# Patient Record
Sex: MALE | Race: WHITE | NOT HISPANIC OR LATINO | Employment: OTHER | URBAN - METROPOLITAN AREA
[De-identification: names, ages, dates, MRNs, and addresses within clinical notes are randomized per-mention and may not be internally consistent; named-entity substitution may affect disease eponyms.]

---

## 2023-04-27 LAB
ALBUMIN (MG/L) IN URINE: <7 MG/L
ALBUMIN/CREATININE (UG/MG) IN URINE: NORMAL UG/MG CRT (ref 0–30)
ANION GAP IN SER/PLAS: 8 MMOL/L (ref 10–20)
CALCIUM (MG/DL) IN SER/PLAS: 9.2 MG/DL (ref 8.6–10.3)
CARBON DIOXIDE, TOTAL (MMOL/L) IN SER/PLAS: 35 MMOL/L (ref 21–32)
CHLORIDE (MMOL/L) IN SER/PLAS: 98 MMOL/L (ref 98–107)
CHOLESTEROL (MG/DL) IN SER/PLAS: 145 MG/DL (ref 0–199)
CHOLESTEROL IN HDL (MG/DL) IN SER/PLAS: 65.8 MG/DL
CHOLESTEROL/HDL RATIO: 2.2
CREATININE (MG/DL) IN SER/PLAS: 0.76 MG/DL (ref 0.5–1.3)
CREATININE (MG/DL) IN URINE: 54.9 MG/DL (ref 20–370)
GFR MALE: >90 ML/MIN/1.73M2
GLUCOSE (MG/DL) IN SER/PLAS: 394 MG/DL (ref 74–99)
LDL: 63 MG/DL (ref 0–99)
POTASSIUM (MMOL/L) IN SER/PLAS: 4.4 MMOL/L (ref 3.5–5.3)
SODIUM (MMOL/L) IN SER/PLAS: 137 MMOL/L (ref 136–145)
TRIGLYCERIDE (MG/DL) IN SER/PLAS: 81 MG/DL (ref 0–149)
UREA NITROGEN (MG/DL) IN SER/PLAS: 13 MG/DL (ref 6–23)
VLDL: 16 MG/DL (ref 0–40)

## 2023-04-28 LAB
ESTIMATED AVERAGE GLUCOSE FOR HBA1C: 243 MG/DL
HEMOGLOBIN A1C/HEMOGLOBIN TOTAL IN BLOOD: 10.1 %

## 2023-10-03 ENCOUNTER — APPOINTMENT (OUTPATIENT)
Dept: RADIOLOGY | Facility: HOSPITAL | Age: 58
End: 2023-10-03
Payer: COMMERCIAL

## 2023-10-03 ENCOUNTER — HOSPITAL ENCOUNTER (OUTPATIENT)
Dept: RADIOLOGY | Facility: HOSPITAL | Age: 58
End: 2023-10-03
Payer: COMMERCIAL

## 2023-10-11 ENCOUNTER — HOSPITAL ENCOUNTER (OUTPATIENT)
Dept: RADIOLOGY | Facility: HOSPITAL | Age: 58
Discharge: HOME | End: 2023-10-11
Payer: COMMERCIAL

## 2023-10-11 DIAGNOSIS — M19.011 PRIMARY OSTEOARTHRITIS, RIGHT SHOULDER: ICD-10-CM

## 2023-10-11 PROCEDURE — 71260 CT THORAX DX C+: CPT | Performed by: RADIOLOGY

## 2023-10-11 PROCEDURE — 71250 CT THORAX DX C-: CPT

## 2023-10-11 PROCEDURE — 73200 CT UPPER EXTREMITY W/O DYE: CPT | Mod: RT

## 2023-10-11 PROCEDURE — 73200 CT UPPER EXTREMITY W/O DYE: CPT | Mod: RIGHT SIDE | Performed by: STUDENT IN AN ORGANIZED HEALTH CARE EDUCATION/TRAINING PROGRAM

## 2023-10-11 PROCEDURE — 76377 3D RENDER W/INTRP POSTPROCES: CPT

## 2023-10-11 PROCEDURE — 76377 3D RENDER W/INTRP POSTPROCES: CPT | Mod: RIGHT SIDE | Performed by: STUDENT IN AN ORGANIZED HEALTH CARE EDUCATION/TRAINING PROGRAM

## 2023-10-14 ENCOUNTER — PHARMACY VISIT (OUTPATIENT)
Dept: PHARMACY | Facility: CLINIC | Age: 58
End: 2023-10-14
Payer: COMMERCIAL

## 2023-10-14 PROCEDURE — RXMED WILLOW AMBULATORY MEDICATION CHARGE

## 2023-10-20 PROBLEM — L60.1 ONYCHOLYSIS: Status: ACTIVE | Noted: 2022-04-04

## 2023-10-20 PROBLEM — M17.11 PRIMARY OSTEOARTHRITIS OF RIGHT KNEE: Status: ACTIVE | Noted: 2023-10-20

## 2023-10-20 PROBLEM — B02.9 HERPES ZOSTER: Status: ACTIVE | Noted: 2023-10-20

## 2023-10-20 PROBLEM — D22.70 MELANOCYTIC NEVI OF UNSPECIFIED LOWER LIMB, INCLUDING HIP: Status: ACTIVE | Noted: 2022-04-04

## 2023-10-20 PROBLEM — D22.9 MELANOCYTIC NEVI, UNSPECIFIED: Status: ACTIVE | Noted: 2022-04-04

## 2023-10-20 PROBLEM — M19.011 ARTHRITIS OF SHOULDER REGION, RIGHT: Status: ACTIVE | Noted: 2023-10-20

## 2023-10-20 PROBLEM — I10 BENIGN ESSENTIAL HYPERTENSION: Status: ACTIVE | Noted: 2021-09-21

## 2023-10-20 PROBLEM — N52.9 FUNCTIONAL IMPOTENCE: Status: ACTIVE | Noted: 2021-09-21

## 2023-10-20 PROBLEM — E11.65 TYPE 2 DIABETES MELLITUS WITH HYPERGLYCEMIA (MULTI): Status: ACTIVE | Noted: 2020-02-12

## 2023-10-20 PROBLEM — F41.1 GENERALIZED ANXIETY DISORDER: Status: ACTIVE | Noted: 2020-02-12

## 2023-10-20 PROBLEM — E66.01 MORBID OBESITY (MULTI): Status: ACTIVE | Noted: 2023-10-20

## 2023-10-20 PROBLEM — I47.10 PSVT (PAROXYSMAL SUPRAVENTRICULAR TACHYCARDIA) (CMS-HCC): Status: ACTIVE | Noted: 2023-10-20

## 2023-10-20 PROBLEM — M12.579 TRAUMATIC ARTHROPATHY OF ANKLE: Status: ACTIVE | Noted: 2023-10-20

## 2023-10-20 PROBLEM — L57.9 SKIN CHANGES DUE TO CHRONIC EXPOSURE TO NONIONIZING RADIATION, UNSPECIFIED: Status: ACTIVE | Noted: 2022-04-04

## 2023-10-20 PROBLEM — E78.5 HYPERLIPIDEMIA: Status: ACTIVE | Noted: 2021-09-21

## 2023-10-20 PROBLEM — D22.39 MELANOCYTIC NEVI OF OTHER PARTS OF FACE: Status: ACTIVE | Noted: 2022-04-04

## 2023-10-20 PROBLEM — K20.90 ESOPHAGITIS: Status: ACTIVE | Noted: 2023-10-20

## 2023-10-20 PROBLEM — R63.4 UNEXPLAINED WEIGHT LOSS: Status: ACTIVE | Noted: 2023-10-20

## 2023-10-20 PROBLEM — G89.4 CHRONIC PAIN SYNDROME: Status: ACTIVE | Noted: 2020-02-12

## 2023-10-20 PROBLEM — Z15.09 MSH2-RELATED LYNCH SYNDROME (HNPCC1): Status: ACTIVE | Noted: 2023-10-20

## 2023-10-20 PROBLEM — G47.33 OBSTRUCTIVE SLEEP APNEA SYNDROME: Status: ACTIVE | Noted: 2020-02-12

## 2023-10-20 PROBLEM — F33.9 DEPRESSION, RECURRENT (CMS-HCC): Status: ACTIVE | Noted: 2023-10-20

## 2023-10-20 PROBLEM — Z79.4 LONG-TERM INSULIN USE (MULTI): Status: ACTIVE | Noted: 2023-10-20

## 2023-10-20 PROBLEM — D22.5 MELANOCYTIC NEVI OF TRUNK: Status: ACTIVE | Noted: 2022-04-04

## 2023-10-20 PROBLEM — F41.1 ANXIETY STATE: Status: ACTIVE | Noted: 2023-10-20

## 2023-10-20 PROBLEM — Z15.09 GENETIC SUSCEPTIBILITY TO OTHER MALIGNANT NEOPLASM: Status: ACTIVE | Noted: 2020-02-12

## 2023-10-20 PROBLEM — G47.00 INSOMNIA: Status: ACTIVE | Noted: 2023-10-20

## 2023-10-20 PROBLEM — M25.511 BILATERAL SHOULDER PAIN: Status: ACTIVE | Noted: 2023-10-20

## 2023-10-20 PROBLEM — D48.5 NEOPLASM OF UNCERTAIN BEHAVIOR OF SKIN: Status: ACTIVE | Noted: 2022-04-04

## 2023-10-20 PROBLEM — M25.512 BILATERAL SHOULDER PAIN: Status: ACTIVE | Noted: 2023-10-20

## 2023-10-20 PROBLEM — Z85.820 PERSONAL HISTORY OF MALIGNANT MELANOMA OF SKIN: Status: ACTIVE | Noted: 2022-04-04

## 2023-10-20 PROBLEM — M50.30 DEGENERATIVE DISC DISEASE, CERVICAL: Status: ACTIVE | Noted: 2023-10-20

## 2023-10-20 PROBLEM — L81.4 OTHER MELANIN HYPERPIGMENTATION: Status: ACTIVE | Noted: 2022-04-04

## 2023-10-20 PROBLEM — D18.01 HEMANGIOMA OF SKIN AND SUBCUTANEOUS TISSUE: Status: ACTIVE | Noted: 2022-04-04

## 2023-10-20 PROBLEM — L90.5 SCAR CONDITION AND FIBROSIS OF SKIN: Status: ACTIVE | Noted: 2022-04-04

## 2023-10-20 PROBLEM — R10.9 PAIN, ABDOMINAL: Status: ACTIVE | Noted: 2023-10-20

## 2023-10-20 PROBLEM — D22.60 MELANOCYTIC NEVI OF UNSPECIFIED UPPER LIMB, INCLUDING SHOULDER: Status: ACTIVE | Noted: 2022-04-04

## 2023-10-20 PROBLEM — I11.9 BENIGN HYPERTENSIVE HEART DISEASE: Status: ACTIVE | Noted: 2023-10-20

## 2023-10-20 PROBLEM — F52.21 PSYCHOGENIC IMPOTENCE: Status: ACTIVE | Noted: 2020-10-26

## 2023-10-20 PROBLEM — L57.0 ACTINIC KERATOSIS: Status: ACTIVE | Noted: 2022-04-04

## 2023-10-20 PROBLEM — K63.5 POLYP OF COLON: Status: ACTIVE | Noted: 2023-10-20

## 2023-10-20 RX ORDER — BUPROPION HYDROCHLORIDE 150 MG/1
1 TABLET ORAL EVERY MORNING
COMMUNITY
Start: 2022-09-19 | End: 2024-05-24 | Stop reason: WASHOUT

## 2023-10-20 RX ORDER — BUPRENORPHINE AND NALOXONE 8; 2 MG/1; MG/1
1 FILM, SOLUBLE BUCCAL; SUBLINGUAL DAILY
COMMUNITY
End: 2024-05-24 | Stop reason: WASHOUT

## 2023-10-20 RX ORDER — GABAPENTIN 300 MG/1
300 CAPSULE ORAL 2 TIMES DAILY
Status: ON HOLD | COMMUNITY
End: 2023-11-21

## 2023-10-20 RX ORDER — CELECOXIB 200 MG/1
200 CAPSULE ORAL DAILY
Status: ON HOLD | COMMUNITY
End: 2023-11-21

## 2023-10-20 RX ORDER — LAMOTRIGINE 100 MG/1
100 TABLET ORAL NIGHTLY
Status: ON HOLD | COMMUNITY
End: 2023-11-21 | Stop reason: ALTCHOICE

## 2023-10-20 RX ORDER — ATORVASTATIN CALCIUM 10 MG/1
10 TABLET, FILM COATED ORAL DAILY
Status: ON HOLD | COMMUNITY
Start: 2021-04-26 | End: 2023-11-21 | Stop reason: ALTCHOICE

## 2023-10-20 RX ORDER — OXYCODONE AND ACETAMINOPHEN 7.5; 325 MG/1; MG/1
1 TABLET ORAL 3 TIMES DAILY
COMMUNITY

## 2023-10-20 RX ORDER — POLYETHYLENE GLYCOL 3350, SODIUM SULFATE ANHYDROUS, SODIUM BICARBONATE, SODIUM CHLORIDE, POTASSIUM CHLORIDE 236; 22.74; 6.74; 5.86; 2.97 G/4L; G/4L; G/4L; G/4L; G/4L
POWDER, FOR SOLUTION ORAL
COMMUNITY
Start: 2022-06-27 | End: 2024-05-24 | Stop reason: WASHOUT

## 2023-10-20 RX ORDER — LORAZEPAM 1 MG/1
1 TABLET ORAL NIGHTLY PRN
Status: ON HOLD | COMMUNITY
Start: 2018-04-11 | End: 2023-11-21 | Stop reason: ALTCHOICE

## 2023-10-20 RX ORDER — DULAGLUTIDE 3 MG/.5ML
3 INJECTION, SOLUTION SUBCUTANEOUS
COMMUNITY
Start: 2021-10-11 | End: 2024-05-24 | Stop reason: WASHOUT

## 2023-10-20 RX ORDER — SULFAMETHOXAZOLE AND TRIMETHOPRIM 800; 160 MG/1; MG/1
1 TABLET ORAL EVERY 12 HOURS
Status: ON HOLD | COMMUNITY
End: 2023-11-21 | Stop reason: ALTCHOICE

## 2023-10-20 RX ORDER — QUETIAPINE FUMARATE 50 MG/1
150 TABLET, FILM COATED ORAL NIGHTLY
COMMUNITY

## 2023-10-20 RX ORDER — OXYCODONE HYDROCHLORIDE 30 MG/1
30 TABLET, FILM COATED, EXTENDED RELEASE ORAL 2 TIMES DAILY
Status: ON HOLD | COMMUNITY
End: 2023-11-21 | Stop reason: ALTCHOICE

## 2023-10-20 RX ORDER — GLIMEPIRIDE 2 MG/1
2 TABLET ORAL
Status: ON HOLD | COMMUNITY
End: 2023-11-21 | Stop reason: ALTCHOICE

## 2023-10-20 RX ORDER — KETOROLAC TROMETHAMINE 10 MG/1
TABLET, FILM COATED ORAL
Status: ON HOLD | COMMUNITY
Start: 2021-08-18 | End: 2023-11-21 | Stop reason: ALTCHOICE

## 2023-10-20 RX ORDER — HYDROXYZINE HYDROCHLORIDE 10 MG/1
1 TABLET, FILM COATED ORAL 3 TIMES DAILY PRN
COMMUNITY
Start: 2018-10-02 | End: 2024-05-24 | Stop reason: WASHOUT

## 2023-10-20 RX ORDER — BUPROPION HYDROCHLORIDE 300 MG/1
300 TABLET ORAL EVERY MORNING
COMMUNITY

## 2023-10-20 RX ORDER — LISINOPRIL 10 MG/1
10 TABLET ORAL DAILY
COMMUNITY
Start: 2021-09-21 | End: 2024-05-24 | Stop reason: ALTCHOICE

## 2023-10-20 RX ORDER — ONDANSETRON 4 MG/1
4 TABLET, ORALLY DISINTEGRATING ORAL 3 TIMES DAILY
Status: ON HOLD | COMMUNITY
Start: 2023-03-18 | End: 2023-11-21 | Stop reason: ALTCHOICE

## 2023-10-20 RX ORDER — DEXTROSE 4 G
TABLET,CHEWABLE ORAL
COMMUNITY
Start: 2020-03-11 | End: 2024-05-24 | Stop reason: WASHOUT

## 2023-10-20 RX ORDER — LAMOTRIGINE 25 MG/1
1 TABLET ORAL DAILY
Status: ON HOLD | COMMUNITY
End: 2023-11-21 | Stop reason: ALTCHOICE

## 2023-10-20 RX ORDER — RISPERIDONE 0.25 MG/1
0.5 TABLET ORAL DAILY
Status: ON HOLD | COMMUNITY
End: 2023-11-21 | Stop reason: ALTCHOICE

## 2023-10-20 RX ORDER — CELECOXIB 400 MG/1
400 CAPSULE ORAL DAILY
Status: ON HOLD | COMMUNITY
End: 2023-11-21

## 2023-10-20 RX ORDER — ESCITALOPRAM OXALATE 20 MG/1
20 TABLET ORAL DAILY
COMMUNITY

## 2023-10-20 RX ORDER — QUETIAPINE FUMARATE 25 MG/1
1 TABLET, FILM COATED ORAL DAILY
Status: ON HOLD | COMMUNITY
Start: 2021-10-11 | End: 2023-11-21 | Stop reason: ALTCHOICE

## 2023-10-20 RX ORDER — LANCETS
EACH MISCELLANEOUS
COMMUNITY
Start: 2020-03-11

## 2023-10-20 RX ORDER — DOXYCYCLINE 100 MG/1
100 CAPSULE ORAL
Status: ON HOLD | COMMUNITY
Start: 2022-04-04 | End: 2023-11-21 | Stop reason: ALTCHOICE

## 2023-10-20 RX ORDER — CEPHALEXIN 500 MG/1
500 TABLET ORAL 4 TIMES DAILY
Status: ON HOLD | COMMUNITY
Start: 2023-08-14 | End: 2023-11-21 | Stop reason: ALTCHOICE

## 2023-10-20 RX ORDER — QUETIAPINE FUMARATE 50 MG/1
50 TABLET, FILM COATED ORAL DAILY
Status: ON HOLD | COMMUNITY
End: 2023-11-21 | Stop reason: ALTCHOICE

## 2023-10-20 RX ORDER — ESCITALOPRAM OXALATE 10 MG/1
1 TABLET ORAL DAILY
Status: ON HOLD | COMMUNITY
End: 2023-11-21

## 2023-10-20 RX ORDER — OMEPRAZOLE 40 MG/1
40 CAPSULE, DELAYED RELEASE ORAL DAILY
Status: ON HOLD | COMMUNITY
Start: 2020-10-09 | End: 2023-11-21 | Stop reason: ALTCHOICE

## 2023-10-20 RX ORDER — LAMOTRIGINE 150 MG/1
1 TABLET ORAL DAILY
COMMUNITY

## 2023-10-20 RX ORDER — OXYCODONE HYDROCHLORIDE 15 MG/1
15 TABLET ORAL EVERY 4 HOURS PRN
Status: ON HOLD | COMMUNITY
Start: 2014-07-08 | End: 2023-11-21 | Stop reason: ALTCHOICE

## 2023-10-23 ENCOUNTER — OFFICE VISIT (OUTPATIENT)
Dept: ORTHOPEDIC SURGERY | Facility: CLINIC | Age: 58
End: 2023-10-23
Payer: COMMERCIAL

## 2023-10-23 VITALS — BODY MASS INDEX: 31.08 KG/M2 | HEIGHT: 71 IN | WEIGHT: 222 LBS

## 2023-10-23 DIAGNOSIS — M19.011 ARTHRITIS OF RIGHT SHOULDER REGION: Primary | ICD-10-CM

## 2023-10-23 DIAGNOSIS — M75.121 COMPLETE TEAR OF RIGHT ROTATOR CUFF, UNSPECIFIED WHETHER TRAUMATIC: ICD-10-CM

## 2023-10-23 DIAGNOSIS — M75.21 BICEPS TENDINITIS OF RIGHT SHOULDER: ICD-10-CM

## 2023-10-23 PROCEDURE — 4010F ACE/ARB THERAPY RXD/TAKEN: CPT | Performed by: ORTHOPAEDIC SURGERY

## 2023-10-23 PROCEDURE — 99215 OFFICE O/P EST HI 40 MIN: CPT | Performed by: ORTHOPAEDIC SURGERY

## 2023-10-23 PROCEDURE — 3046F HEMOGLOBIN A1C LEVEL >9.0%: CPT | Performed by: ORTHOPAEDIC SURGERY

## 2023-10-23 NOTE — PROGRESS NOTES
58-year-old male who is 8 years status post right shoulder open failed rotator cuff repair with severe pain in his bilateral shoulders worse on the right shoulder.  He states he has pain every day that significantly impacts his activities of daily living.  Pain is worse activity better with rest.  He has failed anti-inflammatories physical therapy and steroid injections    Patients' self reported past medical history, medications, allergies, surgical history, family and social history as well as a 10 point review of systems has been documented in the new patient intake form and scanned into the patient's electronic medical record.  The intake form was reviewed by Dr Otero during the office visit and signed by Dr. Otero and the patient.  Pertinent findings are documented in the HPI.    General Multi-System Physical Exam:  Constitutional  General appearance:  Alert, oriented, and in no acute distress.  Well developed, well nourished.  Head and Face  Head and face:  Normocephalic and atraumatic.  Ears, Nose, Mouth, and Throat  External inspection of ears and nose: Normal.  Eyes:  Pupils are equal and round.  Neck  Neck:  no neck mass was observed.  Pulmonary  Respiratory effort:  no respiratory distress.  Cardiovascular  Intact distal pulses.  Lymphatic  Palpation of lymph nodes in the affected extremity:  Normal.  Skin  Skin and subcutaneous tissue:  Normal skin color and pigmentation.  Normal skin turgor.  No rashes.  Neurologic  Sensation:  normal to light touch.  Psychiatric  Judgement and insight:  Intact.  Mood and affect:  Normal.  Musculoskeletal  Right shoulder has a maximum of 60 degrees of abduction forward flexion with a maximum external rotation of 10 degrees of internal rotation and maximum internal rotation to not quite greater trochanter.  Positive Neer positive Dominguez positive Jobes with pain and severe weakness positive biceps tenderness.  Grossly neurovascular tact right upper extremity      Branden independently interpreted the patient's CT (performed by the Radiology department) by viewing the CT images and this is Dr. Otero's personal interpretation:     CT shows severe arthritis right shoulder with posterior sided wear      We had a long discussion regards to his right shoulder severe arthritis and rotator cuff tearing.  We talked about conservative treatment which the patient states has failed and he is in severe pain.  We talked about right shoulder reverse shoulder placement of the biceps tenodesis and talked about the risks including but not limited to infection risk of injury to the axillary nerve risk of shoulder dislocation or dissociation.  Patient understands all the risk versus benefits wants to proceed with surgery signed appropriate surgical consents for a right reverse shoulder placement biceps tenodesis on November 21 see him back for surgery sooner if necessary        This patient has had longstanding pain and weakness in their affected extremity which has gotten significantly worse over the last few months.  Non-operative treatment has failed to help this patient and the pain is severe enough to warrent surgery as the appropriate treatment at this time.  That would classify this problem as a chronic illness with severe exacerbation and progression.    We discussed their surgery at great length.  This is an elective major surgery which is warranted in this case due to the patient's failure of non-operative treatment and their significant level of pain and progression of the disease.  We discussed identified patient and procedural risk factors and how these risk factors may increase the risk of the surgery or influence the outcome of the surgical procedure.   We also discussed how delaying surgery and continuing non-operative treatment may lead to a progression of the disease and high risk of further morbidity.

## 2023-11-06 ENCOUNTER — OFFICE VISIT (OUTPATIENT)
Dept: PRIMARY CARE | Facility: CLINIC | Age: 58
End: 2023-11-06
Payer: COMMERCIAL

## 2023-11-06 VITALS
BODY MASS INDEX: 33.19 KG/M2 | TEMPERATURE: 97.3 F | DIASTOLIC BLOOD PRESSURE: 84 MMHG | SYSTOLIC BLOOD PRESSURE: 136 MMHG | OXYGEN SATURATION: 95 % | WEIGHT: 238 LBS | HEART RATE: 86 BPM

## 2023-11-06 DIAGNOSIS — J18.9 ATYPICAL PNEUMONIA: Primary | ICD-10-CM

## 2023-11-06 PROBLEM — R10.9 PAIN, ABDOMINAL: Status: RESOLVED | Noted: 2023-10-20 | Resolved: 2023-11-06

## 2023-11-06 PROBLEM — B02.9 HERPES ZOSTER: Status: RESOLVED | Noted: 2023-10-20 | Resolved: 2023-11-06

## 2023-11-06 PROCEDURE — 3079F DIAST BP 80-89 MM HG: CPT | Performed by: FAMILY MEDICINE

## 2023-11-06 PROCEDURE — 1036F TOBACCO NON-USER: CPT | Performed by: FAMILY MEDICINE

## 2023-11-06 PROCEDURE — 99214 OFFICE O/P EST MOD 30 MIN: CPT | Performed by: FAMILY MEDICINE

## 2023-11-06 PROCEDURE — 3075F SYST BP GE 130 - 139MM HG: CPT | Performed by: FAMILY MEDICINE

## 2023-11-06 PROCEDURE — 3046F HEMOGLOBIN A1C LEVEL >9.0%: CPT | Performed by: FAMILY MEDICINE

## 2023-11-06 PROCEDURE — 4010F ACE/ARB THERAPY RXD/TAKEN: CPT | Performed by: FAMILY MEDICINE

## 2023-11-06 RX ORDER — AMOXICILLIN 500 MG/1
1000 TABLET, FILM COATED ORAL EVERY 8 HOURS SCHEDULED
Qty: 42 TABLET | Refills: 0 | Status: SHIPPED | OUTPATIENT
Start: 2023-11-06 | End: 2023-11-22 | Stop reason: HOSPADM

## 2023-11-06 RX ORDER — DOXYCYCLINE 100 MG/1
100 CAPSULE ORAL 2 TIMES DAILY
Qty: 14 CAPSULE | Refills: 0 | Status: SHIPPED | OUTPATIENT
Start: 2023-11-06 | End: 2023-11-22 | Stop reason: HOSPADM

## 2023-11-06 ASSESSMENT — ENCOUNTER SYMPTOMS
SHORTNESS OF BREATH: 0
ABDOMINAL PAIN: 0
COUGH: 0
FEVER: 0
CHILLS: 0
DYSURIA: 0
WHEEZING: 0
DIARRHEA: 0
NAUSEA: 0
VOMITING: 0

## 2023-11-06 NOTE — PROGRESS NOTES
Subjective   Patient ID: Louis Viera is a 58 y.o. male who presents for Discuss CT results (Per pt CT was showing Pneumonia on recent scan).    Louis presents to discuss abnormal lung findings. While shoulder being imaged, abnormal lung finding noted. CT chest done to further evaluate. Suspected atypical pneumonia in right upper lobe. Patient is currently asymptomatic. No cough, shortness of breath or wheezing.          Review of Systems   Constitutional:  Negative for chills and fever.   Respiratory:  Negative for cough, shortness of breath and wheezing.    Cardiovascular:  Negative for chest pain.   Gastrointestinal:  Negative for abdominal pain, diarrhea, nausea and vomiting.   Genitourinary:  Negative for dysuria.       Objective   /84   Pulse 86   Temp 36.3 °C (97.3 °F)   Wt 108 kg (238 lb)   SpO2 95%   BMI 33.19 kg/m²     Physical Exam  Constitutional:       General: He is not in acute distress.     Appearance: Normal appearance.   HENT:      Head: Normocephalic.      Mouth/Throat:      Mouth: Mucous membranes are moist.   Eyes:      Extraocular Movements: Extraocular movements intact.      Conjunctiva/sclera: Conjunctivae normal.   Cardiovascular:      Rate and Rhythm: Normal rate and regular rhythm.      Heart sounds: No murmur heard.  Pulmonary:      Breath sounds: No wheezing or rhonchi.   Musculoskeletal:      Cervical back: Neck supple.   Skin:     General: Skin is warm and dry.   Neurological:      Mental Status: He is alert.   Psychiatric:         Mood and Affect: Mood normal.         Behavior: Behavior normal.         Assessment/Plan   Diagnoses and all orders for this visit:  Atypical pneumonia  Comments:  Chest CT reviewed. Suspected right upper lobe atypical penumonia. Start amoxicillin and doxycycline per treatment guidelines. Recheck CXR in 1-2 weeks.  Orders:  -     amoxicillin (Amoxil) 500 mg tablet; Take 2 tablets (1,000 mg) by mouth every 8 hours for 7 days.  -     XR chest  2 views; Future  -     doxycycline (Vibramycin) 100 mg capsule; Take 1 capsule (100 mg) by mouth 2 times a day for 7 days. Take with at least 8 ounces (large glass) of water, do not lie down for 30 minutes after

## 2023-11-09 ENCOUNTER — ANESTHESIA EVENT (OUTPATIENT)
Dept: OPERATING ROOM | Facility: HOSPITAL | Age: 58
End: 2023-11-09
Payer: COMMERCIAL

## 2023-11-14 ENCOUNTER — TELEPHONE (OUTPATIENT)
Dept: ORTHOPEDIC SURGERY | Facility: CLINIC | Age: 58
End: 2023-11-14
Payer: COMMERCIAL

## 2023-11-14 NOTE — TELEPHONE ENCOUNTER
Patient confirmed he is proceeding with surgery on 11/21/23. Patient does not have a sling.   You can access the FollowMyHealth Patient Portal offered by [facility] by registering at the following website: [email or web address]. By joining [facility]’s FollowMyHealth portal, you will also be able to view your health information using other applications (apps) compatible with our system.

## 2023-11-15 ENCOUNTER — PRE-ADMISSION TESTING (OUTPATIENT)
Dept: PREADMISSION TESTING | Facility: HOSPITAL | Age: 58
End: 2023-11-15
Payer: COMMERCIAL

## 2023-11-15 ENCOUNTER — HOSPITAL ENCOUNTER (OUTPATIENT)
Dept: CARDIOLOGY | Facility: HOSPITAL | Age: 58
Discharge: HOME | End: 2023-11-15
Payer: COMMERCIAL

## 2023-11-15 ENCOUNTER — HOSPITAL ENCOUNTER (OUTPATIENT)
Dept: RADIOLOGY | Facility: HOSPITAL | Age: 58
Discharge: HOME | End: 2023-11-15
Payer: COMMERCIAL

## 2023-11-15 VITALS
RESPIRATION RATE: 18 BRPM | TEMPERATURE: 98.2 F | SYSTOLIC BLOOD PRESSURE: 131 MMHG | HEART RATE: 92 BPM | DIASTOLIC BLOOD PRESSURE: 82 MMHG

## 2023-11-15 DIAGNOSIS — E11.65 TYPE 2 DIABETES MELLITUS WITH HYPERGLYCEMIA, UNSPECIFIED WHETHER LONG TERM INSULIN USE (MULTI): ICD-10-CM

## 2023-11-15 DIAGNOSIS — I10 BENIGN ESSENTIAL HYPERTENSION: ICD-10-CM

## 2023-11-15 DIAGNOSIS — E11.65 TYPE 2 DIABETES MELLITUS WITH HYPERGLYCEMIA, WITH LONG-TERM CURRENT USE OF INSULIN (MULTI): Primary | ICD-10-CM

## 2023-11-15 DIAGNOSIS — J18.9 PNEUMONIA OF RIGHT UPPER LOBE DUE TO INFECTIOUS ORGANISM: ICD-10-CM

## 2023-11-15 DIAGNOSIS — Z79.4 TYPE 2 DIABETES MELLITUS WITH HYPERGLYCEMIA, WITH LONG-TERM CURRENT USE OF INSULIN (MULTI): Primary | ICD-10-CM

## 2023-11-15 DIAGNOSIS — J18.9 ATYPICAL PNEUMONIA: ICD-10-CM

## 2023-11-15 DIAGNOSIS — G47.33 OBSTRUCTIVE SLEEP APNEA SYNDROME: ICD-10-CM

## 2023-11-15 DIAGNOSIS — M19.011 ARTHRITIS OF RIGHT SHOULDER REGION: Primary | ICD-10-CM

## 2023-11-15 DIAGNOSIS — M19.011 ARTHRITIS OF RIGHT SHOULDER REGION: ICD-10-CM

## 2023-11-15 LAB
ANION GAP SERPL CALC-SCNC: 9 MMOL/L (ref 10–20)
BUN SERPL-MCNC: 32 MG/DL (ref 6–23)
CALCIUM SERPL-MCNC: 9.5 MG/DL (ref 8.6–10.3)
CHLORIDE SERPL-SCNC: 99 MMOL/L (ref 98–107)
CO2 SERPL-SCNC: 31 MMOL/L (ref 21–32)
CREAT SERPL-MCNC: 1.09 MG/DL (ref 0.5–1.3)
ERYTHROCYTE [DISTWIDTH] IN BLOOD BY AUTOMATED COUNT: 12.7 % (ref 11.5–14.5)
GFR SERPL CREATININE-BSD FRML MDRD: 80 ML/MIN/1.73M*2
GLUCOSE SERPL-MCNC: 142 MG/DL (ref 74–99)
HCT VFR BLD AUTO: 41.2 % (ref 41–52)
HGB BLD-MCNC: 13.6 G/DL (ref 13.5–17.5)
MCH RBC QN AUTO: 28 PG (ref 26–34)
MCHC RBC AUTO-ENTMCNC: 33 G/DL (ref 32–36)
MCV RBC AUTO: 85 FL (ref 80–100)
NRBC BLD-RTO: 0 /100 WBCS (ref 0–0)
PLATELET # BLD AUTO: 222 X10*3/UL (ref 150–450)
POTASSIUM SERPL-SCNC: 4.4 MMOL/L (ref 3.5–5.3)
RBC # BLD AUTO: 4.85 X10*6/UL (ref 4.5–5.9)
SODIUM SERPL-SCNC: 135 MMOL/L (ref 136–145)
WBC # BLD AUTO: 6.5 X10*3/UL (ref 4.4–11.3)

## 2023-11-15 PROCEDURE — 85027 COMPLETE CBC AUTOMATED: CPT

## 2023-11-15 PROCEDURE — 80048 BASIC METABOLIC PNL TOTAL CA: CPT

## 2023-11-15 PROCEDURE — 36415 COLL VENOUS BLD VENIPUNCTURE: CPT

## 2023-11-15 PROCEDURE — 71046 X-RAY EXAM CHEST 2 VIEWS: CPT | Mod: FY

## 2023-11-15 PROCEDURE — 93005 ELECTROCARDIOGRAM TRACING: CPT

## 2023-11-15 PROCEDURE — 71046 X-RAY EXAM CHEST 2 VIEWS: CPT | Performed by: STUDENT IN AN ORGANIZED HEALTH CARE EDUCATION/TRAINING PROGRAM

## 2023-11-15 PROCEDURE — 93010 ELECTROCARDIOGRAM REPORT: CPT | Performed by: INTERNAL MEDICINE

## 2023-11-15 PROCEDURE — 99203 OFFICE O/P NEW LOW 30 MIN: CPT | Performed by: CLINICAL NURSE SPECIALIST

## 2023-11-15 ASSESSMENT — DUKE ACTIVITY SCORE INDEX (DASI)
CAN YOU WALK INDOORS, SUCH AS AROUND YOUR HOUSE: YES
CAN YOU TAKE CARE OF YOURSELF (EAT, DRESS, BATHE, OR USE TOILET): YES
CAN YOU PARTICIPATE IN MODERATE RECREATIONAL ACTIVITIES LIKE GOLF, BOWLING, DANCING, DOUBLES TENNIS OR THROWING A BASEBALL OR FOOTBALL: NO
CAN YOU DO MODERATE WORK AROUND THE HOUSE LIKE VACUUMING, SWEEPING FLOORS OR CARRYING GROCERIES: YES
CAN YOU WALK A BLOCK OR TWO ON LEVEL GROUND: NO
CAN YOU DO HEAVY WORK AROUND THE HOUSE LIKE SCRUBBING FLOORS OR LIFTING AND MOVING HEAVY FURNITURE: YES
CAN YOU DO YARD WORK LIKE RAKING LEAVES, WEEDING OR PUSHING A MOWER: YES
CAN YOU PARTICIPATE IN STRENOUS SPORTS LIKE SWIMMING, SINGLES TENNIS, FOOTBALL, BASKETBALL, OR SKIING: NO
CAN YOU CLIMB A FLIGHT OF STAIRS OR WALK UP A HILL: NO
CAN YOU DO LIGHT WORK AROUND THE HOUSE LIKE DUSTING OR WASHING DISHES: YES
CAN YOU RUN A SHORT DISTANCE: NO

## 2023-11-15 ASSESSMENT — LIFESTYLE VARIABLES: SMOKING_STATUS: NONSMOKER

## 2023-11-15 ASSESSMENT — CHADS2 SCORE
CHF: NO
CHADS2 SCORE: 2
PRIOR STROKE OR TIA OR THROMBOEMBOLISM: NO
DIABETES: YES
HYPERTENSION: YES
AGE GREATER THAN OR EQUAL TO 75: NO

## 2023-11-15 ASSESSMENT — ACTIVITIES OF DAILY LIVING (ADL): ADL_SCORE: 0

## 2023-11-15 NOTE — PREPROCEDURE INSTRUCTIONS
"   Medication List            Accurate as of November 15, 2023  1:07 PM. Always use your most recent med list.                Accu-Chek Fastclix Lancet Drum misc  Generic drug: lancets     Accu-Chek Guide Glucose Meter misc  Generic drug: blood-glucose meter     Ativan 1 mg tablet  Generic drug: LORazepam     atorvastatin 10 mg tablet  Commonly known as: Lipitor     BABY ASPIRIN ORAL     BD Ultra-Fine Madeline Pen Needle 32 gauge x 5/32\" needle  Generic drug: pen needle, diabetic  TO BE USED WITH INSULIN 5 TIMES PER DAY     blood sugar diagnostic strip     * buPROPion  mg 24 hr tablet  Commonly known as: Wellbutrin XL     * buPROPion  mg 24 hr tablet  Commonly known as: Wellbutrin XL     * CeleBREX 400 mg capsule  Generic drug: celecoxib     * celecoxib 200 mg capsule  Commonly known as: CeleBREX     cephalexin 500 mg tablet  Commonly known as: Keftab     doxycycline 100 mg capsule  Commonly known as: Monodox     * escitalopram 20 mg tablet  Commonly known as: Lexapro     * Lexapro 10 mg tablet  Generic drug: escitalopram     gabapentin 300 mg capsule  Commonly known as: Neurontin     gabapentin enacarbil 600 mg tablet extended release ER tablet  Commonly known as: Horizant     glimepiride 2 mg tablet  Commonly known as: Amaryl     hydrOXYzine HCL 10 mg tablet  Commonly known as: Atarax     insulin aspart 100 unit/mL (3 mL) pen  Commonly known as: NovoLOG  INJECT UP TO 40 UNITS DAILY PER SLIDING SCALE AS DIRECTED     * insulin detemir 100 unit/mL injection  Commonly known as: Levemir     * LEVEMIR FLEXPEN SUBQ     ketorolac 10 mg tablet  Commonly known as: Toradol     * lamoTRIgine 150 mg tablet  Commonly known as: LaMICtal     * LaMICtal 25 mg tablet  Generic drug: lamoTRIgine     * LaMICtal 100 mg tablet  Generic drug: lamoTRIgine     lisinopril 10 mg tablet     medical cannabis     METFORMIN ORAL     MULTIVITAMIN ORAL     omeprazole 40 mg DR capsule  Commonly known as: PriLOSEC     ondansetron ODT 4 mg " disintegrating tablet  Commonly known as: Zofran-ODT     * oxyCODONE ER 30 mg 12 hr tablet  Commonly known as: OxyCONTIN     * oxyCODONE 15 mg immediate release tablet  Commonly known as: Roxicodone     oxyCODONE-acetaminophen 7.5-325 mg tablet  Commonly known as: Percocet     polyethylene glycol-electrolytes 420 gram solution  Commonly known as: Nulytely     * QUEtiapine 50 mg tablet  Commonly known as: SEROquel     * QUEtiapine 50 mg tablet  Commonly known as: SEROquel     * SeroqueL 25 mg tablet  Generic drug: QUEtiapine     risperiDONE 0.25 mg tablet  Commonly known as: RisperDAL     Suboxone 8-2 mg SL film  Generic drug: buprenorphine-naloxone     sulfamethoxazole-trimethoprim 800-160 mg tablet  Commonly known as: Bactrim DS     * Trulicity 3 mg/0.5 mL pen injector  Generic drug: dulaglutide     * Trulicity 4.5 mg/0.5 mL pen injector  Generic drug: dulaglutide  INJECT 4.5MG SUBCUTANEOUSLY EVERY WEEK           * This list has 18 medication(s) that are the same as other medications prescribed for you. Read the directions carefully, and ask your doctor or other care provider to review them with you.                                  NPO Instructions:    The Day before Surgery:  Review your medication instructions, stop indicated medications  You will be contacted regarding the time of your arrival to facility and surgery time    Additional Instructions:     Review your medication instructions, stop indicated medications  You will be contacted regarding the time of your arrival to facility and surgery time  Do not eat any food after Midnight

## 2023-11-15 NOTE — TELEPHONE ENCOUNTER
Patient requesting refill on Levemir to be sent to [facility] pharmacy. Pended for approval.

## 2023-11-15 NOTE — CPM/PAT H&P
CPM/PAT Evaluation       Name: Louis Viera (Louis Viera)  /Age: 1965/58 y.o.     In-Person       Chief Complaint: Right shoulder pain, scheduled for right reverse total shoulder under block/general anesthesia per Dr. Otero on 23.   Patient currently has right upper lobe pneumonia and being treated with amoxicillin and doxycycline, has follow up PCP appointment with Yessi on  for repeat chest xray and clearance.         Past Medical History:   Diagnosis Date    Encounter for screening for malignant neoplasm of colon 2018    Encounter for screening colonoscopy    Herpes zoster 10/20/2023    Nausea 2020    Daily nausea    Personal history of malignant melanoma of skin     History of malignant melanoma    Personal history of other diseases of the digestive system 2019    History of gastritis    Personal history of other diseases of the nervous system and sense organs     History of sleep apnea       Past Surgical History:   Procedure Laterality Date    OTHER SURGICAL HISTORY  2018    Shoulder surgery    OTHER SURGICAL HISTORY  2018    Knee surgery    OTHER SURGICAL HISTORY  2018    Hand surgery    OTHER SURGICAL HISTORY  2021    Finger fracture repair    OTHER SURGICAL HISTORY  2021    Colonoscopy       Patient  has no history on file for sexual activity.    Family History   Problem Relation Name Age of Onset    Coronary artery disease Father      Other (gene mutation) Sister      Colon cancer Sister      Diabetes Other         Allergies   Allergen Reactions    Armodafinil Palpitations     tachycardia    Fentanyl Palpitations and Other     sweating, tachycardia        Prior to Admission medications    Medication Sig Start Date End Date Taking? Authorizing Provider   amoxicillin (Amoxil) 500 mg tablet Take 2 tablets (1,000 mg) by mouth every 8 hours for 7 days. 23  Marlys Dickson DO   atorvastatin (Lipitor) 10 mg tablet  Take 1 tablet (10 mg) by mouth once daily. 4/26/21   Historical Provider, MD   BABY ASPIRIN ORAL Take 81 mg by mouth once daily.    Historical Provider, MD   blood sugar diagnostic strip  3/11/20   Historical Provider, MD   blood-glucose meter (Accu-Chek Guide Glucose Meter) [facility] to test blood sugar twice daily 3/11/20   Historical Provider, MD   buprenorphine-naloxone (Suboxone) 8-2 mg SL film Place 1 Film under the tongue once daily.    Historical Provider, MD   buPROPion XL (Wellbutrin XL) 150 mg 24 hr tablet Take 1 tablet (150 mg) by mouth once daily in the morning. 9/19/22   Historical Provider, MD   buPROPion XL (Wellbutrin XL) 300 mg 24 hr tablet Take 1 tablet (300 mg) by mouth once daily in the morning.    Historical Provider, MD   celecoxib (CeleBREX) 200 mg capsule Take 1 capsule (200 mg) by mouth once daily.    Historical Provider, MD   celecoxib (CeleBREX) 400 mg capsule Take 1 capsule (400 mg) by mouth once daily.    Historical Provider, MD   cephalexin (Keftab) 500 mg tablet Take 1 tablet (500 mg) by mouth 4 times a day. 8/14/23   Historical Provider, MD   doxycycline (Monodox) 100 mg capsule 1 capsule (100 mg). 4/4/22   Historical Provider, MD   doxycycline (Vibramycin) 100 mg capsule Take 1 capsule (100 mg) by mouth 2 times a day for 7 days. Take with at least 8 ounces (large glass) of water, do not lie down for 30 minutes after 11/6/23 11/13/23  Marlys Dickson,    dulaglutide (Trulicity) 3 mg/0.5 mL pen injector Inject 3 mg under the skin 1 (one) time per week. 10/11/21   Historical Provider, MD   dulaglutide (TRULICITY) 4.5 mg/0.5 mL pen injector INJECT 4.5MG SUBCUTANEOUSLY EVERY WEEK 4/11/23 4/10/24  Roseanne Quiles MD   escitalopram (Lexapro) 10 mg tablet Take 1 tablet (10 mg) by mouth once daily.    Historical Provider, MD   escitalopram (Lexapro) 20 mg tablet Take 1 tablet (20 mg) by mouth once daily.    Historical Provider, MD   gabapentin (Neurontin) 300 mg capsule  Take 1 capsule (300 mg) by mouth 2 times a day.    Historical Provider, MD   gabapentin enacarbil (Horizant) 600 mg tablet extended release ER tablet Take 1 tablet (600 mg) by mouth once daily.    Historical Provider, MD   glimepiride (Amaryl) 2 mg tablet Take 1 tablet (2 mg) by mouth 2 times a day with meals.    Historical Provider, MD   hydrOXYzine HCL (Atarax) 10 mg tablet Take 1 tablet (10 mg) by mouth 3 times a day as needed. 10/2/18   Historical Provider, MD   insulin aspart (NovoLOG) 100 unit/mL (3 mL) pen INJECT UP TO 40 UNITS DAILY PER SLIDING SCALE AS DIRECTED 4/18/23 4/17/24  Roseanne Quiles MD   insulin detemir (LEVEMIR FLEXPEN SUBQ) Inject 60 Units under the skin twice a day.    Historical Provider, MD   insulin detemir (Levemir) 100 unit/mL injection Inject 58 Units under the skin 2 times a day.    Historical Provider, MD   ketorolac (Toradol) 10 mg tablet Ketorolac Tromethamine 10 MG Oral Tablet   Quantity: 20  Refills: 0        Start : 18-Aug-2021   Active 8/18/21   Historical Provider, MD   lamoTRIgine (LaMICtal) 100 mg tablet Take 1 tablet (100 mg) by mouth once daily at bedtime.    Historical Provider, MD   lamoTRIgine (LaMICtal) 150 mg tablet Take 1 tablet (150 mg) by mouth once daily.    Historical Provider, MD   lamoTRIgine (LaMICtal) 25 mg tablet Take 1 tablet (25 mg) by mouth once daily.    Historical Provider, MD   lancets (Accu-Chek Fastclix Lancet Drum) misc Testing twice daily 3/11/20   Historical Provider, MD   lisinopril 10 mg tablet Take 1 tablet (10 mg) by mouth once daily. 9/21/21   Historical Provider, MD   LORazepam (Ativan) 1 mg tablet Take 1 tablet (1 mg) by mouth as needed at bedtime. 4/11/18   Historical Provider, MD   medical cannabis (NOT [facility] PRESCRIBED) DO NOT FILL 4/26/21   Historical Provider, MD   metformin HCl (METFORMIN ORAL) Take 1,000 mg by mouth 2 times a day with meals.    Historical Provider, MD   MULTIVITAMIN ORAL Take 1 tablet by mouth once daily.     "Historical Provider, MD   omeprazole (PriLOSEC) 40 mg DR capsule Take 1 capsule (40 mg) by mouth once daily. 10/9/20   Historical Provider, MD   ondansetron ODT (Zofran-ODT) 4 mg disintegrating tablet Take 1 tablet (4 mg) by mouth 3 times a day. 3/18/23   Historical Provider, MD   oxyCODONE (Roxicodone) 15 mg immediate release tablet Take 1 tablet (15 mg) by mouth every 4 hours if needed. 7/8/14   Historical Provider, MD   oxyCODONE ER (OxyCONTIN) 30 mg 12 hr tablet Take 1 tablet (30 mg) by mouth twice a day.    Historical Provider, MD   oxyCODONE-acetaminophen (Percocet) 7.5-325 mg tablet Take 1 tablet by mouth 3 times a day.    Historical Provider, MD   pen needle, diabetic 32 gauge x 5/32\" needle TO BE USED WITH INSULIN 5 TIMES PER DAY 4/27/23 4/26/24  Roseanne Quiles MD   polyethylene glycol-electrolytes (polyethylene glycol) 420 gram solution Take by mouth. TAKE AS DIRECTED. 6/27/22   Historical Provider, MD   QUEtiapine (Seroquel) 25 mg tablet Take 1 tablet (25 mg) by mouth once daily. 10/11/21   Historical Provider, MD   QUEtiapine (SEROquel) 50 mg tablet Take 3 tablets (150 mg) by mouth once daily at bedtime. AS DIRECTED FOR SLEEP    Historical Provider, MD   QUEtiapine (SEROquel) 50 mg tablet Take 1 tablet (50 mg) by mouth once daily.    Historical Provider, MD   risperiDONE (RisperDAL) 0.25 mg tablet Take 2 tablets (0.5 mg) by mouth once daily.    Historical Provider, MD   sulfamethoxazole-trimethoprim (Bactrim DS) 800-160 mg tablet Take 1 tablet by mouth every 12 hours.    Historical Provider, MD          Visit Vitals  /82   Pulse 92   Temp 36.8 °C (98.2 °F)   Resp 18       DASI Risk Score    No data to display       Caprini DVT Assessment      Flowsheet Row Most Recent Value   DVT Score 11   Current Status COPD, Major surgery planned, lasting 2-3 hours   History COPD, Pneumonia, Prior major surgery   Age 40-59 years   BMI 31-40 (Obesity)          Modified Frailty Index    No data to " display       CHADS2 Stroke Risk  Current as of 6 minutes ago        N/A 3 - 100%: High Risk   2 - 3%: Medium Risk   0 - 2%: Low Risk     Last Change: N/A          This score determines the patient's risk of having a stroke if the patient has atrial fibrillation.        This score is not applicable to this patient. Components are not calculated.          Revised Cardiac Risk Index      Flowsheet Row Most Recent Value   Revised Cardiac Risk Calculator 0          Apfel Simplified Score      Flowsheet Row Most Recent Value   Apfel Simplified Score Calculator 2          Risk Analysis Index Results This Encounter         11/15/2023  1244             BO Cancer History: Patient does not indicate history of cancer    Total Risk Analysis Index Score Without Cancer: 23    Total Risk Analysis Index Score: 23          Stop Bang Score      Flowsheet Row Most Recent Value   Do you snore loudly? 1   Do you often feel tired or fatigued after your sleep? 1   Has anyone ever observed you stop breathing in your sleep? 0   Do you have or are you being treated for high blood pressure? 0   Recent BMI (Calculated) 31   Is BMI greater than 35 kg/m2? 0=No   Age older than 50 years old? 1=Yes   Gender - Male 1=Yes            Assessment and Plan:     Musculoskeletal:  Right shoulder pain, scheduled for right reverse total shoulder under block/general anesthesia per Dr. Otero on 11/21/23. See 11/6/23 Pcp H&P.   Patient currently has right upper lobe pneumonia and being treated with amoxicillin and doxycycline, has follow up PCP appointment with Yessi on 11/17 for repeat chest xray and clearance.

## 2023-11-16 ENCOUNTER — PHARMACY VISIT (OUTPATIENT)
Dept: PHARMACY | Facility: CLINIC | Age: 58
End: 2023-11-16
Payer: COMMERCIAL

## 2023-11-16 LAB
ATRIAL RATE: 85 BPM
P AXIS: 64 DEGREES
PR INTERVAL: 157 MS
Q ONSET: 253 MS
QRS COUNT: 13 BEATS
QRS DURATION: 165 MS
QT INTERVAL: 378 MS
QTC CALCULATION(BAZETT): 452 MS
QTC FREDERICIA: 426 MS
R AXIS: 61 DEGREES
T AXIS: 62 DEGREES
T OFFSET: 442 MS
VENTRICULAR RATE: 86 BPM

## 2023-11-16 PROCEDURE — RXMED WILLOW AMBULATORY MEDICATION CHARGE

## 2023-11-16 RX ORDER — INSULIN DETEMIR 100 [IU]/ML
60 INJECTION, SOLUTION SUBCUTANEOUS 2 TIMES DAILY
Qty: 36 ML | Refills: 5 | Status: SHIPPED | OUTPATIENT
Start: 2023-11-16 | End: 2024-06-06

## 2023-11-17 ENCOUNTER — OFFICE VISIT (OUTPATIENT)
Dept: PRIMARY CARE | Facility: CLINIC | Age: 58
End: 2023-11-17
Payer: COMMERCIAL

## 2023-11-17 VITALS
DIASTOLIC BLOOD PRESSURE: 80 MMHG | BODY MASS INDEX: 33.05 KG/M2 | HEART RATE: 74 BPM | SYSTOLIC BLOOD PRESSURE: 122 MMHG | WEIGHT: 237 LBS | OXYGEN SATURATION: 95 % | TEMPERATURE: 97 F

## 2023-11-17 DIAGNOSIS — E11.65 TYPE 2 DIABETES MELLITUS WITH HYPERGLYCEMIA, UNSPECIFIED WHETHER LONG TERM INSULIN USE (MULTI): ICD-10-CM

## 2023-11-17 DIAGNOSIS — J18.9 ATYPICAL PNEUMONIA: ICD-10-CM

## 2023-11-17 DIAGNOSIS — I10 BENIGN ESSENTIAL HYPERTENSION: ICD-10-CM

## 2023-11-17 DIAGNOSIS — Z01.818 PREOPERATIVE CLEARANCE: Primary | ICD-10-CM

## 2023-11-17 PROCEDURE — 3079F DIAST BP 80-89 MM HG: CPT | Performed by: FAMILY MEDICINE

## 2023-11-17 PROCEDURE — 1036F TOBACCO NON-USER: CPT | Performed by: FAMILY MEDICINE

## 2023-11-17 PROCEDURE — 3046F HEMOGLOBIN A1C LEVEL >9.0%: CPT | Performed by: FAMILY MEDICINE

## 2023-11-17 PROCEDURE — 99213 OFFICE O/P EST LOW 20 MIN: CPT | Performed by: FAMILY MEDICINE

## 2023-11-17 PROCEDURE — 4010F ACE/ARB THERAPY RXD/TAKEN: CPT | Performed by: FAMILY MEDICINE

## 2023-11-17 PROCEDURE — 3074F SYST BP LT 130 MM HG: CPT | Performed by: FAMILY MEDICINE

## 2023-11-17 ASSESSMENT — ENCOUNTER SYMPTOMS
COUGH: 0
VOMITING: 0
NAUSEA: 0
SHORTNESS OF BREATH: 0
DIARRHEA: 0
FEVER: 0
CHILLS: 0
ABDOMINAL PAIN: 0
DYSURIA: 0

## 2023-11-17 NOTE — Clinical Note
Just FYI - Repeat CXR showed atypical pneumonia cleared. He is scheduled for surgery with you 11/21.

## 2023-11-17 NOTE — H&P (VIEW-ONLY)
Subjective   Patient ID: Louis Viera is a 58 y.o. male who presents for Pneumonia (Recheck).    Louis presents for follow-up of atypical pneumonia. He is feeling well. No cough or shortness of breath.           Review of Systems   Constitutional:  Negative for chills and fever.   Respiratory:  Negative for cough and shortness of breath.    Cardiovascular:  Negative for chest pain.   Gastrointestinal:  Negative for abdominal pain, diarrhea, nausea and vomiting.   Genitourinary:  Negative for dysuria.       Objective   /80   Pulse 74   Temp 36.1 °C (97 °F)   Wt 108 kg (237 lb)   SpO2 95%   BMI 33.05 kg/m²     Physical Exam  Constitutional:       General: He is not in acute distress.     Appearance: Normal appearance.   HENT:      Head: Normocephalic.      Mouth/Throat:      Mouth: Mucous membranes are moist.   Eyes:      Extraocular Movements: Extraocular movements intact.      Conjunctiva/sclera: Conjunctivae normal.   Cardiovascular:      Rate and Rhythm: Normal rate and regular rhythm.      Heart sounds: No murmur heard.  Pulmonary:      Breath sounds: No wheezing or rhonchi.   Musculoskeletal:      Cervical back: Neck supple.   Skin:     General: Skin is warm and dry.   Neurological:      Mental Status: He is alert.   Psychiatric:         Mood and Affect: Mood normal.         Behavior: Behavior normal.         Assessment/Plan   Diagnoses and all orders for this visit:  Preoperative clearance  Comments:  Medically optimized for surgery.  Atypical pneumonia  Comments:  Chest x-ray performed 11/15/23 showed total resolution of pneumonia.  Type 2 diabetes mellitus with hyperglycemia, unspecified whether long term insulin use (CMS/[facility])  Comments:  Management per endocrinology. Patient overdue for follow-up.  Benign essential hypertension  Comments:  Controlled.

## 2023-11-17 NOTE — PROGRESS NOTES
Subjective   Patient ID: Louis Viera is a 58 y.o. male who presents for Pneumonia (Recheck).    Louis presents for follow-up of atypical pneumonia. He is feeling well. No cough or shortness of breath.           Review of Systems   Constitutional:  Negative for chills and fever.   Respiratory:  Negative for cough and shortness of breath.    Cardiovascular:  Negative for chest pain.   Gastrointestinal:  Negative for abdominal pain, diarrhea, nausea and vomiting.   Genitourinary:  Negative for dysuria.       Objective   /80   Pulse 74   Temp 36.1 °C (97 °F)   Wt 108 kg (237 lb)   SpO2 95%   BMI 33.05 kg/m²     Physical Exam  Constitutional:       General: He is not in acute distress.     Appearance: Normal appearance.   HENT:      Head: Normocephalic.      Mouth/Throat:      Mouth: Mucous membranes are moist.   Eyes:      Extraocular Movements: Extraocular movements intact.      Conjunctiva/sclera: Conjunctivae normal.   Cardiovascular:      Rate and Rhythm: Normal rate and regular rhythm.      Heart sounds: No murmur heard.  Pulmonary:      Breath sounds: No wheezing or rhonchi.   Musculoskeletal:      Cervical back: Neck supple.   Skin:     General: Skin is warm and dry.   Neurological:      Mental Status: He is alert.   Psychiatric:         Mood and Affect: Mood normal.         Behavior: Behavior normal.         Assessment/Plan   Diagnoses and all orders for this visit:  Preoperative clearance  Comments:  Medically optimized for surgery.  Atypical pneumonia  Comments:  Chest x-ray performed 11/15/23 showed total resolution of pneumonia.  Type 2 diabetes mellitus with hyperglycemia, unspecified whether long term insulin use (CMS/[facility])  Comments:  Management per endocrinology. Patient overdue for follow-up.  Benign essential hypertension  Comments:  Controlled.

## 2023-11-21 ENCOUNTER — ANESTHESIA (OUTPATIENT)
Dept: OPERATING ROOM | Facility: HOSPITAL | Age: 58
End: 2023-11-21
Payer: COMMERCIAL

## 2023-11-21 ENCOUNTER — PHARMACY VISIT (OUTPATIENT)
Dept: PHARMACY | Facility: CLINIC | Age: 58
End: 2023-11-21
Payer: COMMERCIAL

## 2023-11-21 ENCOUNTER — APPOINTMENT (OUTPATIENT)
Dept: RADIOLOGY | Facility: HOSPITAL | Age: 58
End: 2023-11-21
Payer: COMMERCIAL

## 2023-11-21 ENCOUNTER — HOSPITAL ENCOUNTER (OUTPATIENT)
Facility: HOSPITAL | Age: 58
Discharge: HOME | End: 2023-11-22
Attending: ORTHOPAEDIC SURGERY | Admitting: ORTHOPAEDIC SURGERY
Payer: COMMERCIAL

## 2023-11-21 DIAGNOSIS — M75.121 COMPLETE TEAR OF RIGHT ROTATOR CUFF, UNSPECIFIED WHETHER TRAUMATIC: Primary | ICD-10-CM

## 2023-11-21 DIAGNOSIS — M19.011 ARTHRITIS OF RIGHT SHOULDER REGION: ICD-10-CM

## 2023-11-21 DIAGNOSIS — M75.21 BICEPS TENDINITIS OF RIGHT SHOULDER: ICD-10-CM

## 2023-11-21 DIAGNOSIS — J18.9 ATYPICAL PNEUMONIA: ICD-10-CM

## 2023-11-21 LAB
GLUCOSE BLD MANUAL STRIP-MCNC: 258 MG/DL (ref 74–99)
GLUCOSE BLD MANUAL STRIP-MCNC: 465 MG/DL (ref 74–99)

## 2023-11-21 PROCEDURE — 7100000001 HC RECOVERY ROOM TIME - INITIAL BASE CHARGE: Performed by: ORTHOPAEDIC SURGERY

## 2023-11-21 PROCEDURE — RXMED WILLOW AMBULATORY MEDICATION CHARGE

## 2023-11-21 PROCEDURE — 3700000001 HC GENERAL ANESTHESIA TIME - INITIAL BASE CHARGE: Performed by: ORTHOPAEDIC SURGERY

## 2023-11-21 PROCEDURE — 2500000002 HC RX 250 W HCPCS SELF ADMINISTERED DRUGS (ALT 637 FOR MEDICARE OP, ALT 636 FOR OP/ED): Performed by: STUDENT IN AN ORGANIZED HEALTH CARE EDUCATION/TRAINING PROGRAM

## 2023-11-21 PROCEDURE — 2500000004 HC RX 250 GENERAL PHARMACY W/ HCPCS (ALT 636 FOR OP/ED): Performed by: NURSE ANESTHETIST, CERTIFIED REGISTERED

## 2023-11-21 PROCEDURE — 2500000005 HC RX 250 GENERAL PHARMACY W/O HCPCS: Performed by: NURSE ANESTHETIST, CERTIFIED REGISTERED

## 2023-11-21 PROCEDURE — 2500000004 HC RX 250 GENERAL PHARMACY W/ HCPCS (ALT 636 FOR OP/ED): Performed by: PHYSICIAN ASSISTANT

## 2023-11-21 PROCEDURE — 3600000005 HC OR TIME - INITIAL BASE CHARGE - PROCEDURE LEVEL FIVE: Performed by: ORTHOPAEDIC SURGERY

## 2023-11-21 PROCEDURE — 3700000002 HC GENERAL ANESTHESIA TIME - EACH INCREMENTAL 1 MINUTE: Performed by: ORTHOPAEDIC SURGERY

## 2023-11-21 PROCEDURE — 23472 RECONSTRUCT SHOULDER JOINT: CPT | Performed by: ORTHOPAEDIC SURGERY

## 2023-11-21 PROCEDURE — 2500000004 HC RX 250 GENERAL PHARMACY W/ HCPCS (ALT 636 FOR OP/ED): Performed by: ANESTHESIOLOGY

## 2023-11-21 PROCEDURE — C1713 ANCHOR/SCREW BN/BN,TIS/BN: HCPCS | Performed by: ORTHOPAEDIC SURGERY

## 2023-11-21 PROCEDURE — 2780000003 HC OR 278 NO HCPCS: Performed by: ORTHOPAEDIC SURGERY

## 2023-11-21 PROCEDURE — 23430 REPAIR BICEPS TENDON: CPT | Performed by: PHYSICIAN ASSISTANT

## 2023-11-21 PROCEDURE — 82947 ASSAY GLUCOSE BLOOD QUANT: CPT

## 2023-11-21 PROCEDURE — 2720000007 HC OR 272 NO HCPCS: Performed by: ORTHOPAEDIC SURGERY

## 2023-11-21 PROCEDURE — C1776 JOINT DEVICE (IMPLANTABLE): HCPCS | Performed by: ORTHOPAEDIC SURGERY

## 2023-11-21 PROCEDURE — 2500000001 HC RX 250 WO HCPCS SELF ADMINISTERED DRUGS (ALT 637 FOR MEDICARE OP): Performed by: PHYSICIAN ASSISTANT

## 2023-11-21 PROCEDURE — 73030 X-RAY EXAM OF SHOULDER: CPT | Mod: RIGHT SIDE | Performed by: RADIOLOGY

## 2023-11-21 PROCEDURE — 7100000011 HC EXTENDED STAY RECOVERY HOURLY - NURSING UNIT

## 2023-11-21 PROCEDURE — 99222 1ST HOSP IP/OBS MODERATE 55: CPT | Performed by: STUDENT IN AN ORGANIZED HEALTH CARE EDUCATION/TRAINING PROGRAM

## 2023-11-21 PROCEDURE — 2500000005 HC RX 250 GENERAL PHARMACY W/O HCPCS: Performed by: ORTHOPAEDIC SURGERY

## 2023-11-21 PROCEDURE — 7100000002 HC RECOVERY ROOM TIME - EACH INCREMENTAL 1 MINUTE: Performed by: ORTHOPAEDIC SURGERY

## 2023-11-21 PROCEDURE — 3600000010 HC OR TIME - EACH INCREMENTAL 1 MINUTE - PROCEDURE LEVEL FIVE: Performed by: ORTHOPAEDIC SURGERY

## 2023-11-21 PROCEDURE — 73030 X-RAY EXAM OF SHOULDER: CPT | Mod: RT,FY

## 2023-11-21 PROCEDURE — 23472 RECONSTRUCT SHOULDER JOINT: CPT | Performed by: PHYSICIAN ASSISTANT

## 2023-11-21 PROCEDURE — 23430 REPAIR BICEPS TENDON: CPT | Performed by: ORTHOPAEDIC SURGERY

## 2023-11-21 PROCEDURE — 2500000004 HC RX 250 GENERAL PHARMACY W/ HCPCS (ALT 636 FOR OP/ED): Performed by: ORTHOPAEDIC SURGERY

## 2023-11-21 DEVICE — IMPLANTABLE DEVICE
Type: IMPLANTABLE DEVICE | Site: SHOULDER | Status: FUNCTIONAL
Brand: COMPREHENSIVE® REVERSE SHOULDER

## 2023-11-21 DEVICE — IMPLANTABLE DEVICE
Type: IMPLANTABLE DEVICE | Site: SHOULDER | Status: FUNCTIONAL
Brand: STEINMANN PIN

## 2023-11-21 DEVICE — IMPLANTABLE DEVICE: Type: IMPLANTABLE DEVICE | Site: SHOULDER | Status: FUNCTIONAL

## 2023-11-21 DEVICE — IMPLANTABLE DEVICE
Type: IMPLANTABLE DEVICE | Site: SHOULDER | Status: FUNCTIONAL
Brand: COMPREHENSIVE REVERSE SHOULDER

## 2023-11-21 RX ORDER — BUPROPION HYDROCHLORIDE 150 MG/1
150 TABLET ORAL EVERY MORNING
Status: DISCONTINUED | OUTPATIENT
Start: 2023-11-22 | End: 2023-11-21 | Stop reason: SDUPTHER

## 2023-11-21 RX ORDER — SODIUM CHLORIDE, SODIUM LACTATE, POTASSIUM CHLORIDE, CALCIUM CHLORIDE 600; 310; 30; 20 MG/100ML; MG/100ML; MG/100ML; MG/100ML
100 INJECTION, SOLUTION INTRAVENOUS CONTINUOUS
Status: DISCONTINUED | OUTPATIENT
Start: 2023-11-21 | End: 2023-11-21 | Stop reason: HOSPADM

## 2023-11-21 RX ORDER — ROPIVACAINE HYDROCHLORIDE 5 MG/ML
INJECTION, SOLUTION EPIDURAL; INFILTRATION; PERINEURAL AS NEEDED
Status: DISCONTINUED | OUTPATIENT
Start: 2023-11-21 | End: 2023-11-21

## 2023-11-21 RX ORDER — ONDANSETRON HYDROCHLORIDE 2 MG/ML
4 INJECTION, SOLUTION INTRAVENOUS ONCE AS NEEDED
Status: DISCONTINUED | OUTPATIENT
Start: 2023-11-21 | End: 2023-11-21 | Stop reason: HOSPADM

## 2023-11-21 RX ORDER — ACETAMINOPHEN 500 MG
1000 TABLET ORAL EVERY 8 HOURS PRN
Qty: 90 TABLET | Refills: 0 | Status: SHIPPED | OUTPATIENT
Start: 2023-11-21 | End: 2024-05-24 | Stop reason: WASHOUT

## 2023-11-21 RX ORDER — OXYCODONE HYDROCHLORIDE 10 MG/1
10 TABLET ORAL EVERY 4 HOURS PRN
Status: DISCONTINUED | OUTPATIENT
Start: 2023-11-21 | End: 2023-11-22 | Stop reason: HOSPADM

## 2023-11-21 RX ORDER — DIPHENHYDRAMINE HYDROCHLORIDE 50 MG/ML
12.5 INJECTION INTRAMUSCULAR; INTRAVENOUS ONCE AS NEEDED
Status: DISCONTINUED | OUTPATIENT
Start: 2023-11-21 | End: 2023-11-21 | Stop reason: HOSPADM

## 2023-11-21 RX ORDER — GABAPENTIN 300 MG/1
300 CAPSULE ORAL 2 TIMES DAILY
Status: DISCONTINUED | OUTPATIENT
Start: 2023-11-21 | End: 2023-11-22 | Stop reason: HOSPADM

## 2023-11-21 RX ORDER — ONDANSETRON HYDROCHLORIDE 2 MG/ML
4 INJECTION, SOLUTION INTRAVENOUS EVERY 8 HOURS PRN
Status: DISCONTINUED | OUTPATIENT
Start: 2023-11-21 | End: 2023-11-22 | Stop reason: HOSPADM

## 2023-11-21 RX ORDER — MORPHINE SULFATE 2 MG/ML
2 INJECTION, SOLUTION INTRAMUSCULAR; INTRAVENOUS EVERY 5 MIN PRN
Status: DISCONTINUED | OUTPATIENT
Start: 2023-11-21 | End: 2023-11-21 | Stop reason: HOSPADM

## 2023-11-21 RX ORDER — LISINOPRIL 10 MG/1
10 TABLET ORAL DAILY
Status: DISCONTINUED | OUTPATIENT
Start: 2023-11-21 | End: 2023-11-22 | Stop reason: HOSPADM

## 2023-11-21 RX ORDER — OXYCODONE HYDROCHLORIDE 5 MG/1
2.5 TABLET ORAL EVERY 4 HOURS PRN
Status: DISCONTINUED | OUTPATIENT
Start: 2023-11-21 | End: 2023-11-22 | Stop reason: HOSPADM

## 2023-11-21 RX ORDER — OXYCODONE AND ACETAMINOPHEN 5; 325 MG/1; MG/1
1 TABLET ORAL EVERY 4 HOURS PRN
Status: DISCONTINUED | OUTPATIENT
Start: 2023-11-21 | End: 2023-11-21 | Stop reason: HOSPADM

## 2023-11-21 RX ORDER — DROPERIDOL 2.5 MG/ML
0.62 INJECTION, SOLUTION INTRAMUSCULAR; INTRAVENOUS ONCE AS NEEDED
Status: DISCONTINUED | OUTPATIENT
Start: 2023-11-21 | End: 2023-11-21 | Stop reason: HOSPADM

## 2023-11-21 RX ORDER — SODIUM CHLORIDE, SODIUM LACTATE, POTASSIUM CHLORIDE, CALCIUM CHLORIDE 600; 310; 30; 20 MG/100ML; MG/100ML; MG/100ML; MG/100ML
100 INJECTION, SOLUTION INTRAVENOUS CONTINUOUS
Status: DISCONTINUED | OUTPATIENT
Start: 2023-11-21 | End: 2023-11-21

## 2023-11-21 RX ORDER — QUETIAPINE FUMARATE 100 MG/1
200 TABLET, FILM COATED ORAL NIGHTLY
Status: DISCONTINUED | OUTPATIENT
Start: 2023-11-21 | End: 2023-11-22 | Stop reason: HOSPADM

## 2023-11-21 RX ORDER — ONDANSETRON 4 MG/1
4 TABLET, ORALLY DISINTEGRATING ORAL EVERY 8 HOURS PRN
Status: DISCONTINUED | OUTPATIENT
Start: 2023-11-21 | End: 2023-11-22 | Stop reason: HOSPADM

## 2023-11-21 RX ORDER — CEFAZOLIN SODIUM 2 G/100ML
INJECTION, SOLUTION INTRAVENOUS AS NEEDED
Status: DISCONTINUED | OUTPATIENT
Start: 2023-11-21 | End: 2023-11-21

## 2023-11-21 RX ORDER — DEXTROSE MONOHYDRATE 100 MG/ML
0.3 INJECTION, SOLUTION INTRAVENOUS ONCE AS NEEDED
Status: DISCONTINUED | OUTPATIENT
Start: 2023-11-21 | End: 2023-11-22 | Stop reason: HOSPADM

## 2023-11-21 RX ORDER — OXYCODONE HYDROCHLORIDE 5 MG/1
5 TABLET ORAL EVERY 6 HOURS PRN
Qty: 10 TABLET | Refills: 0 | Status: SHIPPED | OUTPATIENT
Start: 2023-11-21 | End: 2023-11-29 | Stop reason: SDUPTHER

## 2023-11-21 RX ORDER — CEFAZOLIN SODIUM 2 G/100ML
2 INJECTION, SOLUTION INTRAVENOUS EVERY 8 HOURS
Status: COMPLETED | OUTPATIENT
Start: 2023-11-21 | End: 2023-11-22

## 2023-11-21 RX ORDER — ASPIRIN 325 MG
325 TABLET, DELAYED RELEASE (ENTERIC COATED) ORAL 2 TIMES DAILY
Status: DISCONTINUED | OUTPATIENT
Start: 2023-11-22 | End: 2023-11-22 | Stop reason: HOSPADM

## 2023-11-21 RX ORDER — HYDRALAZINE HYDROCHLORIDE 20 MG/ML
5 INJECTION INTRAMUSCULAR; INTRAVENOUS EVERY 30 MIN PRN
Status: DISCONTINUED | OUTPATIENT
Start: 2023-11-21 | End: 2023-11-21 | Stop reason: HOSPADM

## 2023-11-21 RX ORDER — DEXTROSE 50 % IN WATER (D50W) INTRAVENOUS SYRINGE
25
Status: DISCONTINUED | OUTPATIENT
Start: 2023-11-21 | End: 2023-11-22 | Stop reason: HOSPADM

## 2023-11-21 RX ORDER — ESCITALOPRAM OXALATE 10 MG/1
20 TABLET ORAL DAILY
Status: DISCONTINUED | OUTPATIENT
Start: 2023-11-21 | End: 2023-11-22 | Stop reason: HOSPADM

## 2023-11-21 RX ORDER — DOXYCYCLINE 100 MG/1
100 CAPSULE ORAL EVERY 12 HOURS SCHEDULED
Status: DISCONTINUED | OUTPATIENT
Start: 2023-11-22 | End: 2023-11-22 | Stop reason: HOSPADM

## 2023-11-21 RX ORDER — DOCUSATE SODIUM 100 MG/1
100 CAPSULE, LIQUID FILLED ORAL 2 TIMES DAILY PRN
Qty: 28 CAPSULE | Refills: 0 | Status: SHIPPED | OUTPATIENT
Start: 2023-11-21 | End: 2023-12-06

## 2023-11-21 RX ORDER — ALBUTEROL SULFATE 0.83 MG/ML
2.5 SOLUTION RESPIRATORY (INHALATION) ONCE AS NEEDED
Status: DISCONTINUED | OUTPATIENT
Start: 2023-11-21 | End: 2023-11-21 | Stop reason: HOSPADM

## 2023-11-21 RX ORDER — NALOXONE HYDROCHLORIDE 0.4 MG/ML
0.2 INJECTION, SOLUTION INTRAMUSCULAR; INTRAVENOUS; SUBCUTANEOUS EVERY 5 MIN PRN
Status: DISCONTINUED | OUTPATIENT
Start: 2023-11-21 | End: 2023-11-22 | Stop reason: HOSPADM

## 2023-11-21 RX ORDER — PHENYLEPHRINE HCL IN 0.9% NACL 0.4MG/10ML
SYRINGE (ML) INTRAVENOUS AS NEEDED
Status: DISCONTINUED | OUTPATIENT
Start: 2023-11-21 | End: 2023-11-21

## 2023-11-21 RX ORDER — OXYCODONE HYDROCHLORIDE 5 MG/1
5 TABLET ORAL EVERY 6 HOURS PRN
Status: DISCONTINUED | OUTPATIENT
Start: 2023-11-21 | End: 2023-11-22 | Stop reason: HOSPADM

## 2023-11-21 RX ORDER — INSULIN LISPRO 100 [IU]/ML
0-15 INJECTION, SOLUTION INTRAVENOUS; SUBCUTANEOUS
Status: DISCONTINUED | OUTPATIENT
Start: 2023-11-21 | End: 2023-11-22 | Stop reason: HOSPADM

## 2023-11-21 RX ORDER — FAMOTIDINE 10 MG/ML
20 INJECTION INTRAVENOUS ONCE
Status: COMPLETED | OUTPATIENT
Start: 2023-11-21 | End: 2023-11-21

## 2023-11-21 RX ORDER — ACETAMINOPHEN 325 MG/1
650 TABLET ORAL EVERY 6 HOURS SCHEDULED
Status: DISCONTINUED | OUTPATIENT
Start: 2023-11-21 | End: 2023-11-22 | Stop reason: HOSPADM

## 2023-11-21 RX ORDER — FENTANYL CITRATE 50 UG/ML
INJECTION, SOLUTION INTRAMUSCULAR; INTRAVENOUS AS NEEDED
Status: DISCONTINUED | OUTPATIENT
Start: 2023-11-21 | End: 2023-11-21

## 2023-11-21 RX ORDER — DEXAMETHASONE SODIUM PHOSPHATE 4 MG/ML
INJECTION, SOLUTION INTRA-ARTICULAR; INTRALESIONAL; INTRAMUSCULAR; INTRAVENOUS; SOFT TISSUE AS NEEDED
Status: DISCONTINUED | OUTPATIENT
Start: 2023-11-21 | End: 2023-11-21

## 2023-11-21 RX ORDER — TRANEXAMIC ACID 100 MG/ML
INJECTION, SOLUTION INTRAVENOUS AS NEEDED
Status: DISCONTINUED | OUTPATIENT
Start: 2023-11-21 | End: 2023-11-21

## 2023-11-21 RX ORDER — INSULIN LISPRO 100 [IU]/ML
3 INJECTION, SOLUTION INTRAVENOUS; SUBCUTANEOUS ONCE
Status: COMPLETED | OUTPATIENT
Start: 2023-11-21 | End: 2023-11-21

## 2023-11-21 RX ORDER — BUPROPION HYDROCHLORIDE 150 MG/1
300 TABLET ORAL EVERY MORNING
Status: DISCONTINUED | OUTPATIENT
Start: 2023-11-22 | End: 2023-11-22 | Stop reason: HOSPADM

## 2023-11-21 RX ORDER — MORPHINE SULFATE 2 MG/ML
2 INJECTION, SOLUTION INTRAMUSCULAR; INTRAVENOUS EVERY 2 HOUR PRN
Status: DISCONTINUED | OUTPATIENT
Start: 2023-11-21 | End: 2023-11-22 | Stop reason: HOSPADM

## 2023-11-21 RX ORDER — LIDOCAINE HYDROCHLORIDE 10 MG/ML
0.1 INJECTION, SOLUTION EPIDURAL; INFILTRATION; INTRACAUDAL; PERINEURAL ONCE
Status: DISCONTINUED | OUTPATIENT
Start: 2023-11-21 | End: 2023-11-21 | Stop reason: HOSPADM

## 2023-11-21 RX ORDER — PROPOFOL 10 MG/ML
INJECTION, EMULSION INTRAVENOUS AS NEEDED
Status: DISCONTINUED | OUTPATIENT
Start: 2023-11-21 | End: 2023-11-21

## 2023-11-21 RX ORDER — ESCITALOPRAM OXALATE 10 MG/1
10 TABLET ORAL DAILY
Status: DISCONTINUED | OUTPATIENT
Start: 2023-11-21 | End: 2023-11-21 | Stop reason: SDUPTHER

## 2023-11-21 RX ORDER — DOCUSATE SODIUM 100 MG/1
100 CAPSULE, LIQUID FILLED ORAL 2 TIMES DAILY
Status: DISCONTINUED | OUTPATIENT
Start: 2023-11-21 | End: 2023-11-22 | Stop reason: HOSPADM

## 2023-11-21 RX ORDER — DOXYCYCLINE 100 MG/1
100 CAPSULE ORAL 2 TIMES DAILY
Qty: 10 CAPSULE | Refills: 0 | Status: SHIPPED | OUTPATIENT
Start: 2023-11-21 | End: 2023-11-27

## 2023-11-21 RX ORDER — ONDANSETRON HYDROCHLORIDE 2 MG/ML
INJECTION, SOLUTION INTRAVENOUS AS NEEDED
Status: DISCONTINUED | OUTPATIENT
Start: 2023-11-21 | End: 2023-11-21

## 2023-11-21 RX ORDER — LABETALOL HYDROCHLORIDE 5 MG/ML
5 INJECTION, SOLUTION INTRAVENOUS ONCE AS NEEDED
Status: DISCONTINUED | OUTPATIENT
Start: 2023-11-21 | End: 2023-11-21 | Stop reason: HOSPADM

## 2023-11-21 RX ORDER — ASPIRIN 325 MG
325 TABLET, DELAYED RELEASE (ENTERIC COATED) ORAL 2 TIMES DAILY
Qty: 60 TABLET | Refills: 0 | Status: SHIPPED | OUTPATIENT
Start: 2023-11-21 | End: 2023-12-22

## 2023-11-21 RX ORDER — MIDAZOLAM HYDROCHLORIDE 1 MG/ML
INJECTION, SOLUTION INTRAMUSCULAR; INTRAVENOUS AS NEEDED
Status: DISCONTINUED | OUTPATIENT
Start: 2023-11-21 | End: 2023-11-21

## 2023-11-21 RX ORDER — MELOXICAM 15 MG/1
15 TABLET ORAL DAILY PRN
Qty: 30 TABLET | Refills: 0 | Status: SHIPPED | OUTPATIENT
Start: 2023-11-21 | End: 2023-12-22

## 2023-11-21 RX ORDER — ROCURONIUM BROMIDE 50 MG/5 ML
SYRINGE (ML) INTRAVENOUS AS NEEDED
Status: DISCONTINUED | OUTPATIENT
Start: 2023-11-21 | End: 2023-11-21

## 2023-11-21 RX ORDER — LIDOCAINE HYDROCHLORIDE 20 MG/ML
INJECTION, SOLUTION INFILTRATION; PERINEURAL AS NEEDED
Status: DISCONTINUED | OUTPATIENT
Start: 2023-11-21 | End: 2023-11-21

## 2023-11-21 RX ORDER — LIDOCAINE HYDROCHLORIDE 20 MG/ML
INJECTION, SOLUTION EPIDURAL; INFILTRATION; INTRACAUDAL; PERINEURAL AS NEEDED
Status: DISCONTINUED | OUTPATIENT
Start: 2023-11-21 | End: 2023-11-21

## 2023-11-21 RX ORDER — VANCOMYCIN HYDROCHLORIDE 1 G/20ML
INJECTION, POWDER, LYOPHILIZED, FOR SOLUTION INTRAVENOUS AS NEEDED
Status: DISCONTINUED | OUTPATIENT
Start: 2023-11-21 | End: 2023-11-21 | Stop reason: HOSPADM

## 2023-11-21 RX ORDER — BUPIVACAINE HCL/EPINEPHRINE 0.25-.0005
VIAL (ML) INJECTION AS NEEDED
Status: DISCONTINUED | OUTPATIENT
Start: 2023-11-21 | End: 2023-11-21 | Stop reason: HOSPADM

## 2023-11-21 RX ADMIN — MIDAZOLAM HYDROCHLORIDE 2 MG: 1 INJECTION, SOLUTION INTRAMUSCULAR; INTRAVENOUS at 10:19

## 2023-11-21 RX ADMIN — QUETIAPINE FUMARATE 200 MG: 100 TABLET ORAL at 20:45

## 2023-11-21 RX ADMIN — FENTANYL CITRATE 50 MCG: 50 INJECTION, SOLUTION INTRAMUSCULAR; INTRAVENOUS at 12:12

## 2023-11-21 RX ADMIN — CEFAZOLIN SODIUM 2 G: 2 INJECTION, SOLUTION INTRAVENOUS at 17:43

## 2023-11-21 RX ADMIN — PROPOFOL 200 MG: 10 INJECTION, EMULSION INTRAVENOUS at 10:19

## 2023-11-21 RX ADMIN — CEFAZOLIN SODIUM 3 G: 2 INJECTION, SOLUTION INTRAVENOUS at 10:13

## 2023-11-21 RX ADMIN — ONDANSETRON 4 MG: 2 INJECTION INTRAMUSCULAR; INTRAVENOUS at 10:24

## 2023-11-21 RX ADMIN — LISINOPRIL 10 MG: 10 TABLET ORAL at 17:41

## 2023-11-21 RX ADMIN — HYDROMORPHONE HYDROCHLORIDE 0.5 MG: 0.5 INJECTION, SOLUTION INTRAMUSCULAR; INTRAVENOUS; SUBCUTANEOUS at 12:53

## 2023-11-21 RX ADMIN — GABAPENTIN 300 MG: 300 CAPSULE ORAL at 20:45

## 2023-11-21 RX ADMIN — DEXAMETHASONE SODIUM PHOSPHATE 4 MG: 4 INJECTION, SOLUTION INTRAMUSCULAR; INTRAVENOUS at 14:21

## 2023-11-21 RX ADMIN — FENTANYL CITRATE 50 MCG: 50 INJECTION, SOLUTION INTRAMUSCULAR; INTRAVENOUS at 12:15

## 2023-11-21 RX ADMIN — FENTANYL CITRATE 100 MCG: 50 INJECTION, SOLUTION INTRAMUSCULAR; INTRAVENOUS at 10:19

## 2023-11-21 RX ADMIN — HYDROMORPHONE HYDROCHLORIDE 0.5 MG: 0.5 INJECTION, SOLUTION INTRAMUSCULAR; INTRAVENOUS; SUBCUTANEOUS at 12:47

## 2023-11-21 RX ADMIN — HYDROMORPHONE HYDROCHLORIDE 0.5 MG: 0.5 INJECTION, SOLUTION INTRAMUSCULAR; INTRAVENOUS; SUBCUTANEOUS at 13:31

## 2023-11-21 RX ADMIN — Medication 100 MCG: at 10:47

## 2023-11-21 RX ADMIN — ROPIVACAINE HYDROCHLORIDE 20 ML: 5 INJECTION, SOLUTION EPIDURAL; INFILTRATION; PERINEURAL at 14:21

## 2023-11-21 RX ADMIN — LIDOCAINE HYDROCHLORIDE 100 MG: 20 INJECTION, SOLUTION EPIDURAL; INFILTRATION; INTRACAUDAL; PERINEURAL at 14:21

## 2023-11-21 RX ADMIN — Medication 100 MCG: at 10:49

## 2023-11-21 RX ADMIN — FAMOTIDINE 20 MG: 10 INJECTION, SOLUTION INTRAVENOUS at 08:42

## 2023-11-21 RX ADMIN — ESCITALOPRAM OXALATE 20 MG: 10 TABLET ORAL at 17:40

## 2023-11-21 RX ADMIN — ONDANSETRON 4 MG: 2 INJECTION INTRAMUSCULAR; INTRAVENOUS at 12:10

## 2023-11-21 RX ADMIN — LIDOCAINE HYDROCHLORIDE 100 MG: 20 INJECTION, SOLUTION INFILTRATION; PERINEURAL at 10:19

## 2023-11-21 RX ADMIN — HYDROMORPHONE HYDROCHLORIDE 0.5 MG: 0.5 INJECTION, SOLUTION INTRAMUSCULAR; INTRAVENOUS; SUBCUTANEOUS at 12:43

## 2023-11-21 RX ADMIN — FENTANYL CITRATE 50 MCG: 50 INJECTION, SOLUTION INTRAMUSCULAR; INTRAVENOUS at 10:57

## 2023-11-21 RX ADMIN — MIDAZOLAM HYDROCHLORIDE 2 MG: 1 INJECTION, SOLUTION INTRAMUSCULAR; INTRAVENOUS at 14:13

## 2023-11-21 RX ADMIN — TRANEXAMIC ACID 1000 MG: 1 INJECTION, SOLUTION INTRAVENOUS at 10:57

## 2023-11-21 RX ADMIN — DOCUSATE SODIUM 100 MG: 100 CAPSULE, LIQUID FILLED ORAL at 20:45

## 2023-11-21 RX ADMIN — ACETAMINOPHEN 650 MG: 325 TABLET ORAL at 17:40

## 2023-11-21 RX ADMIN — Medication 50 MG: at 10:19

## 2023-11-21 RX ADMIN — SODIUM CHLORIDE, POTASSIUM CHLORIDE, SODIUM LACTATE AND CALCIUM CHLORIDE 100 ML/HR: 600; 310; 30; 20 INJECTION, SOLUTION INTRAVENOUS at 08:42

## 2023-11-21 RX ADMIN — SODIUM CHLORIDE, POTASSIUM CHLORIDE, SODIUM LACTATE AND CALCIUM CHLORIDE 100 ML/HR: 600; 310; 30; 20 INJECTION, SOLUTION INTRAVENOUS at 13:01

## 2023-11-21 RX ADMIN — HYDROMORPHONE HYDROCHLORIDE 0.5 MG: 0.5 INJECTION, SOLUTION INTRAMUSCULAR; INTRAVENOUS; SUBCUTANEOUS at 12:58

## 2023-11-21 RX ADMIN — LAMOTRIGINE 150 MG: 100 TABLET ORAL at 17:40

## 2023-11-21 RX ADMIN — SUGAMMADEX 200 MG: 100 INJECTION, SOLUTION INTRAVENOUS at 12:11

## 2023-11-21 RX ADMIN — HYDROMORPHONE HYDROCHLORIDE 0.5 MG: 0.5 INJECTION, SOLUTION INTRAMUSCULAR; INTRAVENOUS; SUBCUTANEOUS at 13:19

## 2023-11-21 RX ADMIN — HYDROMORPHONE HYDROCHLORIDE 0.5 MG: 0.5 INJECTION, SOLUTION INTRAMUSCULAR; INTRAVENOUS; SUBCUTANEOUS at 13:04

## 2023-11-21 RX ADMIN — INSULIN LISPRO 3 UNITS: 100 INJECTION, SOLUTION INTRAVENOUS; SUBCUTANEOUS at 20:52

## 2023-11-21 RX ADMIN — HYDROMORPHONE HYDROCHLORIDE 0.5 MG: 0.5 INJECTION, SOLUTION INTRAMUSCULAR; INTRAVENOUS; SUBCUTANEOUS at 13:58

## 2023-11-21 RX ADMIN — FENTANYL CITRATE 50 MCG: 50 INJECTION, SOLUTION INTRAMUSCULAR; INTRAVENOUS at 11:05

## 2023-11-21 RX ADMIN — DEXAMETHASONE SODIUM PHOSPHATE 8 MG: 4 INJECTION, SOLUTION INTRAMUSCULAR; INTRAVENOUS at 10:24

## 2023-11-21 RX ADMIN — INSULIN LISPRO 15 UNITS: 100 INJECTION, SOLUTION INTRAVENOUS; SUBCUTANEOUS at 20:48

## 2023-11-21 SDOH — SOCIAL STABILITY: SOCIAL INSECURITY: HAS ANYONE EVER THREATENED TO HURT YOUR FAMILY OR YOUR PETS?: NO

## 2023-11-21 SDOH — SOCIAL STABILITY: SOCIAL INSECURITY: DOES ANYONE TRY TO KEEP YOU FROM HAVING/CONTACTING OTHER FRIENDS OR DOING THINGS OUTSIDE YOUR HOME?: NO

## 2023-11-21 SDOH — HEALTH STABILITY: MENTAL HEALTH: CURRENT SMOKER: 0

## 2023-11-21 SDOH — SOCIAL STABILITY: SOCIAL INSECURITY: WERE YOU ABLE TO COMPLETE ALL THE BEHAVIORAL HEALTH SCREENINGS?: YES

## 2023-11-21 SDOH — SOCIAL STABILITY: SOCIAL INSECURITY: DO YOU FEEL UNSAFE GOING BACK TO THE PLACE WHERE YOU ARE LIVING?: NO

## 2023-11-21 SDOH — SOCIAL STABILITY: SOCIAL INSECURITY: ABUSE: ADULT

## 2023-11-21 SDOH — SOCIAL STABILITY: SOCIAL INSECURITY: DO YOU FEEL ANYONE HAS EXPLOITED OR TAKEN ADVANTAGE OF YOU FINANCIALLY OR OF YOUR PERSONAL PROPERTY?: NO

## 2023-11-21 SDOH — SOCIAL STABILITY: SOCIAL INSECURITY: ARE THERE ANY APPARENT SIGNS OF INJURIES/BEHAVIORS THAT COULD BE RELATED TO ABUSE/NEGLECT?: NO

## 2023-11-21 SDOH — SOCIAL STABILITY: SOCIAL INSECURITY: ARE YOU OR HAVE YOU BEEN THREATENED OR ABUSED PHYSICALLY, EMOTIONALLY, OR SEXUALLY BY ANYONE?: NO

## 2023-11-21 SDOH — SOCIAL STABILITY: SOCIAL INSECURITY: HAVE YOU HAD THOUGHTS OF HARMING ANYONE ELSE?: NO

## 2023-11-21 ASSESSMENT — ACTIVITIES OF DAILY LIVING (ADL)
BATHING: NEEDS ASSISTANCE
LACK_OF_TRANSPORTATION: NO
HEARING - RIGHT EAR: FUNCTIONAL
ADEQUATE_TO_COMPLETE_ADL: YES
PATIENT'S MEMORY ADEQUATE TO SAFELY COMPLETE DAILY ACTIVITIES?: YES
HEARING - LEFT EAR: FUNCTIONAL
TOILETING: NEEDS ASSISTANCE
DRESSING YOURSELF: NEEDS ASSISTANCE
WALKS IN HOME: INDEPENDENT
JUDGMENT_ADEQUATE_SAFELY_COMPLETE_DAILY_ACTIVITIES: YES
FEEDING YOURSELF: NEEDS ASSISTANCE
GROOMING: NEEDS ASSISTANCE

## 2023-11-21 ASSESSMENT — PAIN SCALES - GENERAL
PAINLEVEL_OUTOF10: 2
PAIN_LEVEL: 0
PAINLEVEL_OUTOF10: 7
PAINLEVEL_OUTOF10: 8
PAINLEVEL_OUTOF10: 4
PAINLEVEL_OUTOF10: 10 - WORST POSSIBLE PAIN
PAINLEVEL_OUTOF10: 5 - MODERATE PAIN
PAINLEVEL_OUTOF10: 10 - WORST POSSIBLE PAIN
PAINLEVEL_OUTOF10: 10 - WORST POSSIBLE PAIN
PAINLEVEL_OUTOF10: 8
PAINLEVEL_OUTOF10: 8
PAINLEVEL_OUTOF10: 10 - WORST POSSIBLE PAIN
PAINLEVEL_OUTOF10: 8
PAINLEVEL_OUTOF10: 4

## 2023-11-21 ASSESSMENT — COGNITIVE AND FUNCTIONAL STATUS - GENERAL
DRESSING REGULAR UPPER BODY CLOTHING: A LITTLE
PERSONAL GROOMING: A LITTLE
EATING MEALS: A LITTLE
MOBILITY SCORE: 24
DAILY ACTIVITIY SCORE: 18
TOILETING: A LITTLE
DRESSING REGULAR LOWER BODY CLOTHING: A LITTLE
PATIENT BASELINE BEDBOUND: NO
HELP NEEDED FOR BATHING: A LITTLE

## 2023-11-21 ASSESSMENT — PATIENT HEALTH QUESTIONNAIRE - PHQ9
1. LITTLE INTEREST OR PLEASURE IN DOING THINGS: MORE THAN HALF THE DAYS
SUM OF ALL RESPONSES TO PHQ9 QUESTIONS 1 & 2: 3
2. FEELING DOWN, DEPRESSED OR HOPELESS: SEVERAL DAYS

## 2023-11-21 ASSESSMENT — ENCOUNTER SYMPTOMS
COLOR CHANGE: 0
PHOTOPHOBIA: 0
BLOOD IN STOOL: 0
DIZZINESS: 0
DYSURIA: 0
VOMITING: 0
LIGHT-HEADEDNESS: 0
TREMORS: 0
SLEEP DISTURBANCE: 0
PALPITATIONS: 0
FEVER: 0
CONFUSION: 0
POLYDIPSIA: 0
NAUSEA: 0
SHORTNESS OF BREATH: 0
COUGH: 0
WEAKNESS: 0
ABDOMINAL PAIN: 0
HEMATURIA: 0
CHILLS: 0
BACK PAIN: 1
ARTHRALGIAS: 1

## 2023-11-21 ASSESSMENT — LIFESTYLE VARIABLES
HOW OFTEN DO YOU HAVE 6 OR MORE DRINKS ON ONE OCCASION: NEVER
SKIP TO QUESTIONS 9-10: 1
AUDIT-C TOTAL SCORE: 0
HOW OFTEN DO YOU HAVE A DRINK CONTAINING ALCOHOL: NEVER
AUDIT-C TOTAL SCORE: 0
HOW MANY STANDARD DRINKS CONTAINING ALCOHOL DO YOU HAVE ON A TYPICAL DAY: PATIENT DOES NOT DRINK

## 2023-11-21 ASSESSMENT — PAIN - FUNCTIONAL ASSESSMENT
PAIN_FUNCTIONAL_ASSESSMENT: 0-10
PAIN_FUNCTIONAL_ASSESSMENT: 0-10

## 2023-11-21 ASSESSMENT — COLUMBIA-SUICIDE SEVERITY RATING SCALE - C-SSRS
2. HAVE YOU ACTUALLY HAD ANY THOUGHTS OF KILLING YOURSELF?: NO
6. HAVE YOU EVER DONE ANYTHING, STARTED TO DO ANYTHING, OR PREPARED TO DO ANYTHING TO END YOUR LIFE?: NO
1. IN THE PAST MONTH, HAVE YOU WISHED YOU WERE DEAD OR WISHED YOU COULD GO TO SLEEP AND NOT WAKE UP?: NO

## 2023-11-21 NOTE — ANESTHESIA PREPROCEDURE EVALUATION
Patient: Louis Viera    Procedure Information       Date/Time: 11/21/23 1000    Procedure: Arthroplasty Reverse Shoulder (SDS - admit after surgery) (Right: Shoulder)    Location: [facility] / [facility]    Surgeons: ROSE Otero MD            Relevant Problems   Cardiovascular   (+) Benign essential hypertension   (+) Hyperlipidemia   (+) PSVT (paroxysmal supraventricular tachycardia)      Endocrine   (+) Morbid obesity (CMS/HCC)   (+) Type 2 diabetes mellitus with hyperglycemia (CMS/HCC)      Neuro/Psych   (+) Anxiety state   (+) Depression, recurrent (CMS/HCC)   (+) Generalized anxiety disorder      Pulmonary   (+) Obstructive sleep apnea syndrome      Musculoskeletal   (+) Chronic pain syndrome   (+) Degenerative disc disease, cervical   (+) Primary osteoarthritis of right knee       Clinical information reviewed:   Tobacco  Allergies  Meds  Problems  Med Hx  Surg Hx   Fam Hx  Soc   Hx        NPO Detail:  NPO/Void Status  Date of Last Liquid: 11/20/23  Time of Last Liquid: 2230  Date of Last Solid: 11/20/23  Time of Last Solid: 2230         Physical Exam    Airway  Mallampati: III  TM distance: >3 FB     Cardiovascular    Dental   Comments: Poor Dentition with chipped teeth   Pulmonary - normal exam     Abdominal - normal exam             Anesthesia Plan    ASA 3     general   (GA  Possible Right Interscalene Nerve Block)  The patient is not a current smoker.    intravenous induction   Postoperative administration of opioids is intended.  Anesthetic plan and risks discussed with patient.  Use of blood products discussed with patient who.    Plan discussed with CRNA.

## 2023-11-21 NOTE — CONSULTS
Inpatient consult to Medicine  Consult performed by: Regina Valentine PA-C  Consult ordered by: Dodie Wilcox PA-C        Reason For Consult  Medical management / discharge assistance     History Of Present Illness  Louis Viera is a 58 y.o. male with PMHx s/f HTN, PSVT, IDDM-II, Jean syndrome, GERD, anxiety and depression, chronic pain on medical cannabis, admitted following a right reverse shoulder arthroplasty with Dr. Otero.  Medicine consulted for medical management and discharge assistance on postop day 1.  Patient seen and examined this evening, reports that his pain is overall fairly well-controlled after getting a nerve block.  He is starting to have some of the pins and needle sensations mostly in his thumb.  He is denying any chest pain, palpitations, focal or lateralizing sensorimotor deficits, shortness of breath, abdominal pain, fevers, chills.  He is a little anxious to get out of the hospital in the morning and reports that as long as he is okay with therapy Dr. Otero told him he could leave first thing.  He has tolerated oral intake without difficulty.  Of note, patient reports that his blood pressure is overall fairly well-controlled but admits that when he was in some pain postoperatively in the PACU his blood pressure was elevated.  He did take his medications as scheduled today.  He reports that his nephew was coming to visit this evening.  Patient is aware of in-house presence of the medical service tonight if questions or concerns arise.  He is hopeful for [place] early discharge at this time.     Past Medical History  He has a past medical history of Anxiety, Depression, Diabetes mellitus (CMS/HCC), Encounter for screening for malignant neoplasm of colon (12/17/2018), Herpes zoster (10/20/2023), Nausea (01/31/2020), Personal history of malignant melanoma of skin, Personal history of other diseases of the digestive system (01/30/2019), and Personal history of other diseases of the  nervous system and sense organs.    Surgical History  He has a past surgical history that includes Other surgical history (12/17/2018); Other surgical history (12/17/2018); Other surgical history (12/17/2018); Other surgical history (09/28/2021); and Other surgical history (09/28/2021).     Social History  He reports that he quit smoking about 30 years ago. His smoking use included cigarettes. He has never used smokeless tobacco. He reports current drug use. Drug: Marijuana. He reports that he does not drink alcohol.    Family History  Family History   Problem Relation Name Age of Onset    Coronary artery disease Father      Other (gene mutation) Sister      Colon cancer Sister      Diabetes Other          Allergies  Armodafinil and Fentanyl    Review of Systems  Review of Systems   Constitutional:  Negative for chills and fever.   Eyes:  Negative for photophobia and visual disturbance.   Respiratory:  Negative for cough and shortness of breath.    Cardiovascular:  Negative for chest pain, palpitations and leg swelling.   Gastrointestinal:  Negative for abdominal pain, blood in stool, nausea and vomiting.   Endocrine: Negative for polydipsia and polyuria.   Genitourinary:  Negative for decreased urine volume, dysuria, hematuria and urgency.   Musculoskeletal:  Positive for arthralgias and back pain.        Reports that his right shoulder is actually doing very well, right thumb with pins/needle sensation but otherwise doing okay.  He states he does have chronic back/hip/ankle pain which is at baseline otherwise.   Skin:  Negative for color change and rash.   Neurological:  Negative for dizziness, tremors, syncope, weakness and light-headedness.   Psychiatric/Behavioral:  Negative for confusion and sleep disturbance.    All other systems reviewed and are negative.        Physical Exam  Physical Exam  Vitals and nursing note reviewed. Exam conducted with a chaperone present (patient's nursing aide present during  exam).   Constitutional:       General: He is awake. He is not in acute distress.     Appearance: Normal appearance. He is well-developed. He is not ill-appearing, toxic-appearing or diaphoretic.   HENT:      Head: Normocephalic and atraumatic.      Right Ear: External ear normal.      Left Ear: External ear normal.      Nose: Nose normal.      Mouth/Throat:      Mouth: Mucous membranes are moist.      Pharynx: No oropharyngeal exudate or posterior oropharyngeal erythema.   Eyes:      General: No scleral icterus.     Extraocular Movements: Extraocular movements intact.      Pupils: Pupils are equal, round, and reactive to light.   Cardiovascular:      Rate and Rhythm: Normal rate and regular rhythm.      Pulses: Normal pulses.      Heart sounds: No murmur heard.     No friction rub. No gallop.   Pulmonary:      Effort: Pulmonary effort is normal. No respiratory distress.      Breath sounds: No wheezing, rhonchi or rales.   Abdominal:      General: There is no distension.      Palpations: Abdomen is soft. There is no hepatomegaly, splenomegaly or mass.      Tenderness: There is no abdominal tenderness.   Musculoskeletal:         General: No deformity or signs of injury.      Cervical back: Normal range of motion and neck supple. No rigidity or tenderness.      Comments: RUE in sling -- bandaging C/D/I. Sensation intact to light touch. Grasping my hand without difficulty. Good cap refill.    Skin:     General: Skin is warm and dry.      Capillary Refill: Capillary refill takes less than 2 seconds.      Coloration: Skin is not pale.      Findings: No erythema, lesion or rash.   Neurological:      General: No focal deficit present.      Mental Status: He is alert and oriented to person, place, and time.      Cranial Nerves: No cranial nerve deficit.   Psychiatric:         Mood and Affect: Mood normal.         Behavior: Behavior normal. Behavior is cooperative.        Last Recorded Vitals  /79 (BP Location: Left  arm, Patient Position: Lying)   Pulse 64   Temp 35.7 °C (96.3 °F) (Temporal)   Resp 16   Wt 108 kg (238 lb)   SpO2 98%     Relevant Results  Results for orders placed or performed during the hospital encounter of 11/21/23 (from the past 24 hour(s))   POCT GLUCOSE   Result Value Ref Range    POCT Glucose 258 (H) 74 - 99 mg/dL      XR shoulder right 2+ views    Result Date: 11/21/2023  STUDY: Shoulder Radiographs; 11/21/2023, 01:50PM INDICATION: Reverse total shoulder arthroplasty. COMPARISON: 10/11/2023 CT shoulder. ACCESSION NUMBER(S): [id number] ORDERING CLINICIAN: KETTY PENNY TECHNIQUE:  Two view(s) of the right shoulder. FINDINGS:  The patient status post reverse total shoulder arthroplasty on the right in anatomic alignment.  There is surgical staples and postoperative air in the soft tissues.  No soft tissue abnormality is seen.    Postop right reverse total shoulder arthroplasty. Signed by Bruno Yi MD    XR chest 2 views    Result Date: 11/16/2023  Interpreted By:  Amanda Cedillo, STUDY: XR CHEST 2 VIEWS; 11/15/2023 1:22 pm   INDICATION: Recheck abnormal lung finding; possible atypical pneumonia; recheck status post treatment   COMPARISON: CT chest 10/11/2023   ACCESSION NUMBER(S): [id number]   ORDERING CLINICIAN: BERNIE LEONARD   TECHNIQUE: Frontal and lateral radiographs of the chest were obtained.   FINDINGS: LINES AND DEVICES: None.   LUNGS: Previously shown right apical ground-glass opacities are not present radiographically. No focal consolidation, pleural effusion, edema or pneumothorax.   CARDIOMEDIASTINAL SILHOUETTE: The cardiomediastinal silhouette is within normal limits.   OTHER: No acute osseous abnormality.       Resolved right apical pneumonia.   MACRO None   Signed by: Amanda Cedillo 11/16/2023 9:32 PM Dictation workstation:   [id number]    ECG 12 Lead    Result Date: 11/16/2023  Sinus rhythm Nonspecific intraventricular conduction delay Confirmed by Tony Coleman ([id number]) on  11/16/2023 11:27:00 AM       Assessment/Plan     58 y.o. male with PMHx s/f HTN, PSVT, IDDM-II, Jean syndrome, GERD, anxiety and depression, chronic pain on medical cannabis, admitted following a right reverse shoulder arthroplasty with Dr. Otero.  Medicine consulted for medical management and discharge assistance on postop day 1.     1.) R shoulder arthritis, R rotator cuff tear, biceps tendinitis s/p rTSA 11/21/23   -Pain is very adequately controlled  -Neurovascular status intact  -Postop bandaging is clean dry and intact  -Tolerating meals without difficulty  -PT/OT evaluation in the morning  -Plan for discharge likely in the morning following this evaluation, patient is anxious to discharge as soon as possible in the morning    2.) Essential hypertension  -Patient has had elevated blood pressure readings throughout stay in PACU and on transfer to 3 E., suspect a degree of this is due to his pain.  He reports that his blood pressure is typically fairly well-controlled at home.  -Will continue home medications and continue analgesia attempts.  If persistently elevated, can make additional adjustments as needed    3.) IDDM-II   -Continue SSI ACHS  -Confirm and resume home Lantus dosing  -Hypoglycemic protocol  -Monitor and adjust as needed  -Endocrinology: Dr. Del Cid    4.) Jean syndrome   -Following with Dr. Dyer for colonoscopy and management at this time     5.) Anxiety and depression   -Continue home therapies     6.) Chronic pain ([facility])  -Resume home medical marijuana on discharge, close follow-up with prescribers   -Patient reports multiple planned orthopedic interventions to help with his pain in the upcoming year    Regina Valentine PA-C    Dragon dictation software was used to dictate this note and thus there may be minor errors in translation/transcription including garbled speech or misspellings. Please contact for clarification if needed.     I spent a total of 60 minutes in the overall  professional care of this patient on this date of service.

## 2023-11-21 NOTE — ANESTHESIA POSTPROCEDURE EVALUATION
Patient: Louis Viera    Procedure Summary       Date: 11/21/23 Room / Location: [facility] / [facility]    Anesthesia Start: 1013 Anesthesia Stop: 1240    Procedure: Arthroplasty Reverse Shoulder, Biceps Tendodesis (SDS - admit after surgery) (Right: Shoulder) Diagnosis:       Arthritis of right shoulder region      Complete tear of right rotator cuff, unspecified whether traumatic      Biceps tendinitis of right shoulder      (Arthritis of right shoulder region [M19.011])      (Complete tear of right rotator cuff, unspecified whether traumatic [M75.121])      (Biceps tendinitis of right shoulder [M75.21])    Surgeons: ROSE Otero MD Responsible Provider: GANESH Bobo    Anesthesia Type: general ASA Status: 3            Anesthesia Type: general    Vitals Value Taken Time   /87 11/21/23 1520   Temp 36.4 °C (97.5 °F) 11/21/23 1235   Pulse 96 11/21/23 1520   Resp 14 11/21/23 1520   SpO2 95 % 11/21/23 1520       Anesthesia Post Evaluation    Patient location during evaluation: bedside  Patient participation: complete - patient participated  Level of consciousness: awake  Pain score: 0  Pain management: adequate  Airway patency: patent  Cardiovascular status: acceptable  Respiratory status: acceptable  Hydration status: acceptable  Postoperative Nausea and Vomiting: none        There were no known notable events for this encounter.

## 2023-11-21 NOTE — ANESTHESIA PROCEDURE NOTES
Airway  Date/Time: 11/21/2023 10:19 AM  Urgency: elective    Airway not difficult    Staffing  Performed: CRNA   Authorized by: GANESH Bobo    Performed by: GANESH Bobo  Patient location during procedure: OR    Indications and Patient Condition  Indications for airway management: anesthesia  Spontaneous Ventilation: absent  Sedation level: deep  Preoxygenated: yes  Patient position: sniffing  Mask difficulty assessment: 1 - vent by mask  Planned trial extubation    Final Airway Details  Final airway type: endotracheal airway      Successful airway: ETT  Cuffed: yes   Successful intubation technique: video laryngoscopy  Blade: Harshal  Blade size: #4  ETT size (mm): 7.5  Cormack-Lehane Classification: grade IIa - partial view of glottis  Placement verified by: chest auscultation and capnometry   Cuff volume (mL): 7  Measured from: lips  Number of attempts at approach: 1  Ventilation between attempts: none  Number of other approaches attempted: 0    Additional Comments  Atraumatic intubation with grant 4. Lips teeth same as preanesthetic condition

## 2023-11-21 NOTE — OP NOTE
Arthroplasty Reverse Shoulder, Biceps Tendodesis (SDS - admit after surgery) (R) Operative Note     Date: 2023  OR Location: [facility]    Name: Louis Viera, : 1965, Age: 58 y.o., MRN: [id number], Sex: male    ORTHOPEDIC OPERATIVE REPORT / POST-OP NOTE    SURGEON:  Gustavo Otero MD  ASSISTANT(S):  Dodie Wilcox PA-C, Yefri Breen SA  PRE-OPERATIVE DIAGNOSIS: Right shoulder massive rotator cuff tear with glenohumeral arthritis, proximal biceps tendinopathy/partial tearing  POST-OPERATIVE DIAGNOSIS:  Same  PROCEDURE: Right shoulder reverse total shoulder arthroplasty, biceps tenodesis  CPT CODE(S):  45433, 02160-44  ANESTHESIA:  general + regional  ESTIMATED BLOOD LOSS: 250 mL  SPECIMEN:  None  FINDINGS:  Above  COMPLICATIONS:  None  CONDITION:  Stable to the Recovery Room    I, Dr Otero, was present and scrubbed for the entire surgical procedure, including wound closure.     No qualified Orthopedic Resident was available to assist with this procedure.  Therefore, the assistance of Dodie Wilcox PA-C, was required throughout this entire procedure.      Dodie Wilcox PA-C, is specifically trained and experienced in assisting with this procedure.  During the procedure, she actively assisted Dr. Otero in completing the operation safely and expeditiously by helping to provide exposure, maintain hemostasis, and served other technical functions, such as suturing, assisting in drilling, etc.    We will be billing for Dodie Wilcox PA-C, as a required First Assistant for this procedure.      INDICATION FOR SURGERY:  58-year-old male with longstanding right shoulder pain and weakness.  We treated the patient's shoulder pain and weakness conservatively with anti-inflammatories, physical therapy, and steroid injections.  The patient failed to have significant relief.  X-rays and MRI confirmed a massive rotator cuff tear in the shoulder with glenohumeral arthritis.  The patient was also having pain over  their proximal biceps tendon.  We discussed a reverse total shoulder replacement and biceps tenodesis done at the same time due to the patient's shoulder pain and weakness and proximal biceps pain.  The patient understood all the risks versus benefits of operative and non-operative treatment options.  The risks of shoulder replacement were discussed, which included but were not limited to: risk of infection or risk of injury to the axillary nerve, risks of bleeding, nerve, artery, or muscle damage, risk of continued pain or fracture either intra-operatively or post-operatively, risk of post-operative acromial stress fracture or displaced fracture, risk of dislocation or disassociation of the reverse shoulder replacement, risk of need for additional surgery, risk of anesthesia including risks of heart attack, stroke, or even death.  The patient wanted to proceed with surgery and signed appropriate surgical consents.  On the morning of surgery, I signed the patient's operative shoulder the preoperative holding area.      PROCEDURE:  The procedure was performed using the [employer] Comprehensive Reverse Shoulder system.  The patient was brought to the operating room after anesthesia performed a block on the patient's operative upper extremity.  The patient was placed supine on the operating room table and all of their bony prominences were padded.  A operating room huddle was performed and the patient received IV antibiotics.  The patient was placed into a beachchair position with all of their miriam prominences padded and SCDs were applied to the lower extremities and used throughout the procedure.     The patient's right upper extremity was prepped and draped in the normal sterile fashion.  A pre-incision timeout was called.  An incision was made over the deltopectoral skin which was then carried down bluntly to the deltopectoral fascia.  Bleeders were coagulated with electrocautery.  The deltopectoral fascia was incised  and the cephalic vein was identified and retracted laterally with the deltoid and protected throughout the procedure.  The pectoralis major was retracted medially throughout the procedure.  The superior aspect the pectoralis major was released in order to aid in visualization.  The biceps tendon was identified and found to be erythematous and partially torn.  The decision was made to proceed with a biceps tenodesis as planned due to the damaged nature of the biceps and the fact that the patient had been having pain in this area preoperatively.  The biceps was sewn to the superior aspect the pectoralis major using #2 FiberWire interrupted sutures and then cut just proximal to the biceps tenodesis, completing the biceps tenodesis.  The subscapularis was then tenotomized 1 cm medial to its insertion on the lesser tuberosity.  The shoulder was externally rotated and a massive rotator cuff tear was identified and severe glenohumeral arthritis was seen.    Starting reamers were used up to a size 16 reamer which is found to have excellent fit.  The [employer] cutting guide was placed at 30 degrees of external rotation and a 45 degree cut was made on the humeral head.  Osteophytes were removed with a rongeur.    Broaches were used up to a size 16 which was found to have an excellent fit.    Attention was turned to the glenoid and appropriate retractors were placed.  The labrum was excised.  A guidepin was drilled at a 10 degree cephalad angle using the [employer] guide.  The guidepin was overdrilled with the [employer] glenoid reamer.  The [employer] comprehensive reverse shoulder glenosphere mini baseplate was then impacted into the glenoid.  The central non-locking screw was then measured and inserted with excellent compression.  The 4 peripheral locking screws were then drilled, measured, and inserted.  The [employer] comprehensive reverse shoulder glenosphere +3 mm offset 36 mm head was then impacted onto the mini baseplate and found to be  extremely stable.    The shoulder was then trialed with a standard poly and was found to be loose.  The poly was increased to a +5 tray with a +3 poly which was found to have excellent range of motion with excellent stability.  The humeral components were removed and the shoulder was thoroughly pulsed with pulse lavage.    A [employer] comprehensive shoulder system mini humeral stem 16 mm in diameter by 83 mm long was impacted in the humerus with excellent fit.  A [employer] comprehensive reverse shoulder system mini humeral tray 0 millimeter taper offset, 40 mm diameter, +5 mm thickness tray with +3 mm thickness  highly cross-linked polyethylene was then impacted onto the humeral stem and found to be very stable.  The shoulder was then reduced and found to have excellent range of motion with excellent stability.  The shoulder was then thoroughly pulsed with pulse lavage, followed by Irrisept irrigation, followed by 1 g of vancomycin powder sprinkled over the reverse shoulder replacement.  The subscapularis was repaired to the lesser tuberosity using #2 FiberWire interrupted sutures.  The deltopectoral fascia was loosely reapproximated with 0 Vicryl interrupted sutures.  The skin was closed in layers with 0 Vicryl interrupted sutures for the deep tissue, 2-0 Vicryl interrupted sutures for the subcutaneous tissue, and staples in the skin.  Xeroform, dry sterile dressing, and a gunslinger (0 degree internal/external rotation) shoulder immobilizer was applied.  The patient was then awoken brought to recovery room in good condition.  There were no complications.  Postoperative x-rays were checked in the recovery room and found to have proper location of the reverse shoulder replacement with no fractures seen.

## 2023-11-21 NOTE — DISCHARGE INSTRUCTIONS
Wound Care     Your dressing should remain intact and dry until post-op visit in the office. No showering until seen in clinic--sitting in the bathtub to sponge bathe is ok. Place a protective cover (large garbage bag) over your shoulder with sling on while bathing. Do NOT submerge your operative shoulder in bath or pool water, or get any of your dressings or sling wet.     Surgical Dressing     If your dressing becomes soiled or damp, you may remove the dressing and replace the bandage. Please do not remove steri-strips (small pieces of tape) covering your incisions. Please be certain to wash hands thoroughly prior to changing dressing, do not place any ointments over incisions.     Sling/Immobilizer     Anesthesia effects can last up until 48 hours after surgery.  Please be aware that you may experience numbness in your arm for up to 48 hours after surgery.  During this time it is extremely important that you keep your sling in place at all times because you will not have control of your arm due to the anesthesia effects.  Your sling with supporting abduction pillow should be worn at all times.  Maintain your elbow position against the pillow and even with your side or in front of this position to minimize stress on the repair.       - You will be asked to keep your sling/immobilizer in place at all times during the first 4-6 weeks following surgery.  Please do not discontinue the use of the sling without clearance from Dr. Otero.  During the first 4-6 weeks following surgery, it is very important to limit activity with your arm to achieve optimal results from your surgery.  Never remove sling and raise arm up and down as this may cause injury to your surgically repaired shoulder.     Activity     When sleeping or resting, inclined positions (i.e. reclining chair) using a pillow under the forearm for support may provide better comfort  Do not engage in activities which increase pain/swelling over the first 7-10  days following surgery  Avoid long periods of sitting (without arm supported) or long distance traveling for 2 weeks  May return to ONLY sedentary work  3-4 days after surgery, if pain is tolerable     Continuous icing will help to decrease swelling and provide pain relief.  You may use ice packs every 2 hours for 20 minutes daily until your first post-operative visit.  It is very important to always have protection between the ice pad and your skin.  Never place the ice pad directly on your skin; this could lead to an injury to your skin.  If the ice causes increased pain, skin rash or irritation, discontinue its use and call the office.       Driving     Please do not drive until you are evaluated in the office after surgery.  You are considered an impaired  following surgery, and if you choose to drive, your insurance may not cover any damages that may occur.     Post-operative Medication     1.  Aspirin 325mg 1 tablet twice a day for 4 weeks following surgery.  Aspirin helps to reduce the risk of blood clots following surgery.     2. Colace 1 tablet twice a day when needed for constipation while taking pain medication.    3.  Mobic 15 mg take 1 tablet daily for 5 days, after 5 days take 1 tablet daily only as needed for pain.     4.Tylenol 1gm every 8 hours for 5 days, after 5 days take 1gm every 8 hours only as needed for pain.     5.Gabapentin 300mg daily at bedtime for 5 days, after 5 days you should no longer need this medication.     6. Doxycycline 100mg 1 tablet twice daily for 5 days or 30 days as prescription is written     7. Oxy IR 5mg every 6 hours ONLY TO BE TAKEN FOR SEVERE PAIN.  Do NOT take Oxy IR within 3 hours of taking Gabapentin. Do NOT take Gabapentin within 3 hours of taking Oxy IR. If you are having severe pain and taking Oxy IR at night skip the nightly Gabapentin and only resume when you are not taking Oxy IR at night     Signs and Symptoms of Complications     Although  complications are rare, the following are a list of potential symptoms you should be alert for.    Infection - Increased pain not relieved with medication, fever (temperature of 101.5 degrees Fahrenheit or higher), chills, redness, swelling or drainage (yellow/brown/green) from incision.  Blood Clot - If you experience shortness of breath, chest pain, pain in your chest with deep breaths or difficulty breathing, please report to emergency room immediately.    Persistent Pain - Severe sharp pain not relieved by pain medication.  Persistent and increasing swelling and numbness of the arm.    If a follow-up visit after surgery was not scheduled, please call Dr. Otero's office at [phone] during office business hours (Monday to Friday, 8:30am to 3:30pm) to arrange a follow-up appointment for 2 weeks after your surgery.    If you have any questions, please call Dr. Otero's office at [phone] during office business hours (Monday to Friday, 8:30am to 3:30pm).  Please do not call Dr. Otero's office after 3:30pm or on weekends or holidays.  If you have an urgent issue after 3:30pm or on weekends or holidays, please go immediately to a local emergency room to be evaluated.

## 2023-11-22 VITALS
BODY MASS INDEX: 33.32 KG/M2 | DIASTOLIC BLOOD PRESSURE: 63 MMHG | WEIGHT: 238 LBS | TEMPERATURE: 96.4 F | HEART RATE: 71 BPM | OXYGEN SATURATION: 97 % | HEIGHT: 71 IN | RESPIRATION RATE: 14 BRPM | SYSTOLIC BLOOD PRESSURE: 126 MMHG

## 2023-11-22 PROBLEM — N17.9 AKI (ACUTE KIDNEY INJURY) (CMS-HCC): Status: ACTIVE | Noted: 2023-11-22

## 2023-11-22 LAB
ANION GAP SERPL CALC-SCNC: 10 MMOL/L (ref 10–20)
BUN SERPL-MCNC: 34 MG/DL (ref 6–23)
CALCIUM SERPL-MCNC: 8.6 MG/DL (ref 8.6–10.3)
CHLORIDE SERPL-SCNC: 100 MMOL/L (ref 98–107)
CO2 SERPL-SCNC: 29 MMOL/L (ref 21–32)
CREAT SERPL-MCNC: 1.61 MG/DL (ref 0.5–1.3)
ERYTHROCYTE [DISTWIDTH] IN BLOOD BY AUTOMATED COUNT: 12.8 % (ref 11.5–14.5)
GFR SERPL CREATININE-BSD FRML MDRD: 50 ML/MIN/1.73M*2
GLUCOSE BLD MANUAL STRIP-MCNC: 189 MG/DL (ref 74–99)
GLUCOSE BLD MANUAL STRIP-MCNC: 262 MG/DL (ref 74–99)
GLUCOSE SERPL-MCNC: 198 MG/DL (ref 74–99)
HCT VFR BLD AUTO: 31.3 % (ref 41–52)
HGB BLD-MCNC: 10.3 G/DL (ref 13.5–17.5)
MCH RBC QN AUTO: 27.6 PG (ref 26–34)
MCHC RBC AUTO-ENTMCNC: 32.9 G/DL (ref 32–36)
MCV RBC AUTO: 84 FL (ref 80–100)
NRBC BLD-RTO: 0 /100 WBCS (ref 0–0)
PLATELET # BLD AUTO: 195 X10*3/UL (ref 150–450)
POTASSIUM SERPL-SCNC: 4.3 MMOL/L (ref 3.5–5.3)
RBC # BLD AUTO: 3.73 X10*6/UL (ref 4.5–5.9)
SODIUM SERPL-SCNC: 135 MMOL/L (ref 136–145)
WBC # BLD AUTO: 10.3 X10*3/UL (ref 4.4–11.3)

## 2023-11-22 PROCEDURE — 80048 BASIC METABOLIC PNL TOTAL CA: CPT | Performed by: PHYSICIAN ASSISTANT

## 2023-11-22 PROCEDURE — 97110 THERAPEUTIC EXERCISES: CPT | Mod: GO | Performed by: OCCUPATIONAL THERAPIST

## 2023-11-22 PROCEDURE — 2500000001 HC RX 250 WO HCPCS SELF ADMINISTERED DRUGS (ALT 637 FOR MEDICARE OP): Performed by: PHYSICIAN ASSISTANT

## 2023-11-22 PROCEDURE — 85027 COMPLETE CBC AUTOMATED: CPT | Performed by: PHYSICIAN ASSISTANT

## 2023-11-22 PROCEDURE — 7100000011 HC EXTENDED STAY RECOVERY HOURLY - NURSING UNIT

## 2023-11-22 PROCEDURE — 97165 OT EVAL LOW COMPLEX 30 MIN: CPT | Mod: GO | Performed by: OCCUPATIONAL THERAPIST

## 2023-11-22 PROCEDURE — 99233 SBSQ HOSP IP/OBS HIGH 50: CPT | Performed by: PHYSICIAN ASSISTANT

## 2023-11-22 PROCEDURE — 2500000004 HC RX 250 GENERAL PHARMACY W/ HCPCS (ALT 636 FOR OP/ED): Performed by: PHYSICIAN ASSISTANT

## 2023-11-22 PROCEDURE — 82947 ASSAY GLUCOSE BLOOD QUANT: CPT | Mod: 59

## 2023-11-22 PROCEDURE — 36415 COLL VENOUS BLD VENIPUNCTURE: CPT | Performed by: PHYSICIAN ASSISTANT

## 2023-11-22 RX ADMIN — ACETAMINOPHEN 650 MG: 325 TABLET ORAL at 11:57

## 2023-11-22 RX ADMIN — SODIUM CHLORIDE, POTASSIUM CHLORIDE, SODIUM LACTATE AND CALCIUM CHLORIDE 1000 ML: 600; 310; 30; 20 INJECTION, SOLUTION INTRAVENOUS at 08:46

## 2023-11-22 RX ADMIN — DOXYCYCLINE 100 MG: 100 CAPSULE ORAL at 08:50

## 2023-11-22 RX ADMIN — INSULIN LISPRO 9 UNITS: 100 INJECTION, SOLUTION INTRAVENOUS; SUBCUTANEOUS at 11:57

## 2023-11-22 RX ADMIN — DOCUSATE SODIUM 100 MG: 100 CAPSULE, LIQUID FILLED ORAL at 08:50

## 2023-11-22 RX ADMIN — INSULIN LISPRO 3 UNITS: 100 INJECTION, SOLUTION INTRAVENOUS; SUBCUTANEOUS at 08:52

## 2023-11-22 RX ADMIN — OXYCODONE HYDROCHLORIDE 10 MG: 10 TABLET ORAL at 09:57

## 2023-11-22 RX ADMIN — CEFAZOLIN SODIUM 2 G: 2 INJECTION, SOLUTION INTRAVENOUS at 02:19

## 2023-11-22 RX ADMIN — BUPROPION HYDROCHLORIDE 300 MG: 150 TABLET, FILM COATED, EXTENDED RELEASE ORAL at 09:54

## 2023-11-22 RX ADMIN — ASPIRIN 325 MG: 325 TABLET, COATED ORAL at 08:50

## 2023-11-22 ASSESSMENT — COGNITIVE AND FUNCTIONAL STATUS - GENERAL
DAILY ACTIVITIY SCORE: 22
HELP NEEDED FOR BATHING: A LITTLE
DRESSING REGULAR UPPER BODY CLOTHING: A LITTLE

## 2023-11-22 ASSESSMENT — ENCOUNTER SYMPTOMS
DYSURIA: 0
NUMBNESS: 0
BACK PAIN: 0
FACIAL SWELLING: 0
JOINT SWELLING: 0
EYE PAIN: 0
CHEST TIGHTNESS: 0
TROUBLE SWALLOWING: 0
HALLUCINATIONS: 0
WOUND: 0
CHILLS: 0
BLOOD IN STOOL: 0
VOMITING: 0
FEVER: 0
DIZZINESS: 0
LIGHT-HEADEDNESS: 0
SHORTNESS OF BREATH: 0
ABDOMINAL PAIN: 0
FREQUENCY: 0
SORE THROAT: 0
WEAKNESS: 0
DIARRHEA: 0
FATIGUE: 0
NAUSEA: 0
CONSTIPATION: 0
BRUISES/BLEEDS EASILY: 0
HEMATURIA: 0
HEADACHES: 0
PALPITATIONS: 0
WHEEZING: 0
APPETITE CHANGE: 0
DIAPHORESIS: 0
COUGH: 0
FLANK PAIN: 0

## 2023-11-22 ASSESSMENT — PAIN - FUNCTIONAL ASSESSMENT: PAIN_FUNCTIONAL_ASSESSMENT: 0-10

## 2023-11-22 ASSESSMENT — PAIN SCALES - GENERAL
PAINLEVEL_OUTOF10: 8
PAINLEVEL_OUTOF10: 8

## 2023-11-22 NOTE — NURSING NOTE
Patient discharged home. Discharge paperwork reviewed with patient and no further questions. IV removed prior to leaving

## 2023-11-22 NOTE — PROGRESS NOTES
Occupational Therapy    Evaluation    Patient Name: Louis Viera  MRN: [id number]  Today's Date: 11/22/2023  Time Calculation  Start Time: 0900  Stop Time: 0930  Time Calculation (min): 30 min        Assessment:  OT Assessment: pt. would benefit from ongoing outpt. O.T. to progress R arm function within protocol of shoulder surgery  Prognosis: Excellent  End of Session Communication: Care Coordinator  End of Session Patient Position: Bed, 0 rail up  Prognosis: Excellent  Plan:  Treatment Interventions:  (R pendulum ex. instruction given and performed in room with good pt. understanding)  No Skilled OT: No acute OT goals identified  OT Discharge Recommendations: No further acute OT (outpt. O.T. recommended)  OT - OK to Discharge: Yes  Treatment Interventions:  (R pendulum ex. instruction given and performed in room with good pt. understanding)    Subjective   Current Problem:  1. Complete tear of right rotator cuff, unspecified whether traumatic  acetaminophen (Tylenol) 500 mg tablet    aspirin 325 mg EC tablet    docusate sodium (Colace) 100 mg capsule    meloxicam (Mobic) 15 mg tablet    oxyCODONE (Roxicodone) 5 mg immediate release tablet    doxycycline (Vibramycin) 100 mg capsule      2. Arthritis of right shoulder region [M19.011]        3. Biceps tendinitis of right shoulder [M75.21]        4. Atypical pneumonia      Chest CT reviewed. Suspected right upper lobe atypical penumonia. Start amoxicillin and doxycycline per treatment guidelines. Recheck CXR in 1-2 weeks.        General:  General  Reason for Referral: recent surgery, R reverse total shoulder  Referred By: Branden/Brenden  Past Medical History Relevant to Rehab: R reverse total shoulder, Hx. of CPAP  Co-Treatment: PT  Co-Treatment Reason: assist of 2 for safety  Patient Position Received: Bed, 0 rail up (sitting EOB with sling on)  General Comment: pt. states has been walking in room, agreeable to eval., 8/10 R shoulder pain  Precautions:  Medical  Precautions:  (NWB R arm , sling on at all times except for O.T.  , OK for pendulum ex. , no AROM R arm)     Pain:  Pain Assessment  Pain Assessment: 0-10  Pain Score: 8  Pain Location: Shoulder (also back and neck hurting)    Objective   Cognition:  Overall Cognitive Status: Within Functional Limits           Home Living:  Type of Home: House  Lives With: Spouse (wife works from home)  Prior Function:  Level of [place]: Independent with ADLs and functional transfers, Independent with homemaking with ambulation  IADL History:  Homemaking Responsibilities: Yes  Meal Prep Responsibility: Primary  ADL: mod. Indep. with L hand,     Activity Tolerance:  Endurance: Endurance does not limit participation in activity  Bed Mobility/Transfers: Bed Mobility  Bed Mobility: Yes (instruction to pt. for NWB R arm during supine-to-sit)           Ambulation/Gait Training:  Ambulation/Gait Training  Ambulation/Gait Training Performed: Yes (SBA amb. in room)     Standing Balance:  Dynamic Standing Balance  Dynamic Standing-Comments: indep.   VSensation:  Sensation Comment: pt. reports numbness in R fingers  Strength:  Strength Comments: WNL L UE, R      Coordination:  Movements are Fluid and Coordinated: Yes   Hand Function:  Gross Grasp:  (bilat.  WFL but R digits numb)      Outcome Measures:[facility] Daily Activity  Putting on and taking off regular lower body clothing: None  Bathing (including washing, rinsing, drying): A little  Putting on and taking off regular upper body clothing: A little  Toileting, which includes using toilet, bedpan or urinal: None  Taking care of personal grooming such as brushing teeth: None  Eating Meals: None  Daily Activity - Total Score: 22        Education Documentation  Home Exercise Program, taught by Renetta Ventura OT at 11/22/2023 11:04 AM.  Learner: Patient  Readiness: Eager  Method: Demonstration  Response: Demonstrated Understanding  Comment: R pendulum ex., donning of sling ,  NWB of R arm    Education Comments  No comments found.

## 2023-11-22 NOTE — DISCHARGE SUMMARY
Discharge Diagnosis  Arthritis of right shoulder region    Test Results Pending At Discharge  Pending Labs       No current pending labs.            Hospital Course   58-year-old male who presented with right shoulder pain glenohumeral joint arthritis and failed rotator cuff repair. Patient is now s/p right shoulder reverse total shoulder arthroplasty on 11/21 by Dr. Otero. On the day of surgery, patient was identified in the pre-operative holding area and agreeable to proceed with surgery. Written consent was obtained.  Please see operative note for further details of this procedure. Patient received 24 hours of jose francisco-operative antibiotics and was discharged on 1 week of PO doxycycline. Patient recovered in the PACU before transfer to a regular nursing floor. Patient was started on oxycodone, tylenol, and morphine for pain control and aspirin for DVT prophylaxis. Therapy recommended continued recovery in a home setting for continued physical therapy and wound care. Patient discharged with prescription of Aspirin for DVT prophylaxis for 4 weeks. Patient will follow-up with Dr. Otero's office in 2 weeks for post-operative visit.      Home Medications     Medication List      START taking these medications     acetaminophen 500 mg tablet; Commonly known as: Tylenol; Take 2 tablets   (1,000 mg) by mouth every 8 hours if needed for mild pain (1 - 3) (Take 2   tablets every 8 hours for 5 days, then after 5 days take 2 tablets as   needed for pain).   docusate sodium 100 mg capsule; Commonly known as: Colace; Take 1   capsule (100 mg) by mouth 2 times a day as needed for constipation for up   to 14 days.   * doxycycline 100 mg capsule; Commonly known as: Vibramycin; Take 1   capsule (100 mg) by mouth 2 times a day for 5 days. Take with at least 8   ounces (large glass) of water, do not lie down for 30 minutes after   meloxicam 15 mg tablet; Commonly known as: Mobic; Take 1 tablet (15 mg)   by mouth once daily as needed  "for moderate pain (4 - 6) (pain).   oxyCODONE 5 mg immediate release tablet; Commonly known as: Roxicodone;   Take 1 tablet (5 mg) by mouth every 6 hours if needed for severe pain (7 -   10) (Do not take within 3 hours of GABAPENTIN).  * This list has 1 medication(s) that are the same as other medications   prescribed for you. Read the directions carefully, and ask your doctor or   other care provider to review them with you.     CHANGE how you take these medications     * BABY ASPIRIN ORAL; What changed: Another medication with the same name   was added. Make sure you understand how and when to take each.   * aspirin 325 mg EC tablet; Take 1 tablet (325 mg) by mouth 2 times a   day.; What changed: You were already taking a medication with the same   name, and this prescription was added. Make sure you understand how and   when to take each.  * This list has 2 medication(s) that are the same as other medications   prescribed for you. Read the directions carefully, and ask your doctor or   other care provider to review them with you.     CONTINUE taking these medications     Accu-Chek Fastclix Lancet Drum misc; Generic drug: lancets   Accu-Chek Guide Glucose Meter misc; Generic drug: blood-glucose meter   BD Ultra-Fine Madeline Pen Needle 32 gauge x 5/32\" needle; Generic drug: pen   needle, diabetic; TO BE USED WITH INSULIN 5 TIMES PER DAY   blood sugar diagnostic strip   * buPROPion  mg 24 hr tablet; Commonly known as: Wellbutrin XL   * buPROPion  mg 24 hr tablet; Commonly known as: Wellbutrin XL   escitalopram 20 mg tablet; Commonly known as: Lexapro   hydrOXYzine HCL 10 mg tablet; Commonly known as: Atarax   insulin aspart 100 unit/mL (3 mL) pen; Commonly known as: NovoLOG;   INJECT UP TO 40 UNITS DAILY PER SLIDING SCALE AS DIRECTED   * insulin detemir 100 unit/mL injection; Commonly known as: Levemir   * Levemir FlexPen 100 unit/mL (3 mL) pen; Generic drug: insulin detemir;   Inject 60 Units under the " skin 2 times a day.   lamoTRIgine 150 mg tablet; Commonly known as: LaMICtal   lisinopril 10 mg tablet   medical cannabis   MULTIVITAMIN ORAL   oxyCODONE-acetaminophen 7.5-325 mg tablet; Commonly known as: Percocet   polyethylene glycol-electrolytes 420 gram solution; Commonly known as:   Nulytely   QUEtiapine 50 mg tablet; Commonly known as: SEROquel   Suboxone 8-2 mg SL film; Generic drug: buprenorphine-naloxone   * Trulicity 3 mg/0.5 mL pen injector; Generic drug: dulaglutide  * This list has 5 medication(s) that are the same as other medications   prescribed for you. Read the directions carefully, and ask your doctor or   other care provider to review them with you.     STOP taking these medications     amoxicillin 500 mg tablet; Commonly known as: Amoxil     ASK your doctor about these medications     * doxycycline 100 mg capsule; Commonly known as: Vibramycin; Take 1   capsule (100 mg) by mouth 2 times a day for 7 days. Take with at least 8   ounces (large glass) of water, do not lie down for 30 minutes after; Ask   about: Should I take this medication?   * Trulicity 4.5 mg/0.5 mL pen injector; Generic drug: dulaglutide;   INJECT 4.5MG SUBCUTANEOUSLY EVERY WEEK  * This list has 2 medication(s) that are the same as other medications   prescribed for you. Read the directions carefully, and ask your doctor or   other care provider to review them with you.       Outpatient Follow-Up  Future Appointments   Date Time Provider Department Center   12/4/2023 10:15 AM Dodie Wilcox PA-C [id number] [facility]   2/26/2024  8:00 AM Gustavo Dyer MD [id number] [facility]   3/1/2024 10:30 AM Roseanne Quiles MD [id number] [facility]   5/17/2024  1:30 PM Marlys Dickson DO [id number] [facility]

## 2023-11-22 NOTE — CARE PLAN
The patient's goals for the shift include      The clinical goals for the shift include pt will remain free from falls      Problem: Pain - Adult  Goal: Verbalizes/displays adequate comfort level or baseline comfort level  Outcome: Progressing     Problem: Safety - Adult  Goal: Free from fall injury  Outcome: Progressing     Problem: Discharge Planning  Goal: Discharge to home or other facility with appropriate resources  Outcome: Progressing     Problem: Chronic Conditions and Co-morbidities  Goal: Patient's chronic conditions and co-morbidity symptoms are monitored and maintained or improved  Outcome: Progressing     Problem: Diabetes  Goal: Achieve decreasing blood glucose levels by end of shift  Outcome: Progressing  Goal: Increase stability of blood glucose readings by end of shift  Outcome: Progressing  Goal: Decrease in ketones present in urine by end of shift  Outcome: Progressing  Goal: Maintain electrolyte levels within acceptable range throughout shift  Outcome: Progressing  Goal: Maintain glucose levels >70mg/dl to <250mg/dl throughout shift  Outcome: Progressing  Goal: No changes in neurological exam by end of shift  Outcome: Progressing  Goal: Learn about and adhere to nutrition recommendations by end of shift  Outcome: Progressing  Goal: Vital signs within normal range for age by end of shift  Outcome: Progressing  Goal: Increase self care and/or family involovement by end of shift  Outcome: Progressing  Goal: Receive DSME education by end of shift  Outcome: Progressing     Problem: Pain  Goal: Takes deep breaths with improved pain control throughout the shift  Outcome: Progressing  Goal: Turns in bed with improved pain control throughout the shift  Outcome: Progressing  Goal: Walks with improved pain control throughout the shift  Outcome: Progressing  Goal: Performs ADL's with improved pain control throughout shift  Outcome: Progressing  Goal: Participates in PT with improved pain control throughout  the shift  Outcome: Progressing  Goal: Free from opioid side effects throughout the shift  Outcome: Progressing  Goal: Free from acute confusion related to pain meds throughout the shift  Outcome: Progressing

## 2023-11-22 NOTE — PROGRESS NOTES
Physical Therapy    Physical Therapy Evaluation    Patient Name: Louis Viera  MRN: [id number]  Today's Date: 11/22/2023        Assessment/Plan                Subjective   General Visit Information:  General  Missed Visit: No  Missed Visit Reason:  (Patient screened in room 3303, witnessed patient stand and amb 80 feet with SBA x1, mild wide MATTHEW, no LOB. Patient has no needs for skilled PT, defer to OT for shoulder. Will discharge from PT.)  Home Living:     Prior Level of Function:     Precautions:     Vital Signs:       Objective   Pain:     Cognition:       General Assessments:    Functional Assessments:    Extremity/Trunk Assessments:    Outcome Measures:      Encounter Problems       Encounter Problems (Active)       Pain - Adult              Education Documentation  No documentation found.  Education Comments  No comments found.

## 2023-11-22 NOTE — ASSESSMENT & PLAN NOTE
-Will give 1L IVF bolus  -Patient insistent upon discharge to home  -PVR x1  -Will need close BMP in 3-4 days with PCP

## 2023-11-22 NOTE — PROGRESS NOTES
Louis Viera is a 58 y.o. male on day 0 of admission presenting with Arthritis of right shoulder region.      Subjective     Louis Viera is a 58 y.o. male with PMHx s/f HTN, PSVT, IDDM-II, Jean syndrome, GERD, anxiety and depression, chronic pain on medical cannabis, admitted following a right reverse shoulder arthroplasty with Dr. Otero 11/21/23. He is a little anxious to get out of the hospital in the morning and reports that as long as he is okay with therapy Dr. Otero told him he could leave first thing.  He has tolerated oral intake without difficulty.  Of note, patient reports that his blood pressure is overall fairly well-controlled but admits that when he was in some pain postoperatively in the PACU his blood pressure was elevated.  He did take his medications as scheduled today.  He reports that his nephew was coming to visit this evening.  Patient is aware of in-house presence of the medical service tonight if questions or concerns arise.  He is hopeful for Bright and early discharge at this time.  11/22/23: No acute events overnight. Vitals stable, BP 97/55 (183/87 yesterday). Cr 1.61 (1.09 pre-op). Will give 1L IVF bolus. PVR is 7ml. I did discuss this with patient, he is adamant about discharge to home today. We discussed that he needs to call his PCP officie and discuss having repeat labwork on either Friday or Monday, he endorses understanding.          Review of Systems   Constitutional:  Negative for appetite change, chills, diaphoresis, fatigue and fever.   HENT:  Negative for congestion, ear pain, facial swelling, hearing loss, nosebleeds, sore throat, tinnitus and trouble swallowing.    Eyes:  Negative for pain.   Respiratory:  Negative for cough, chest tightness, shortness of breath and wheezing.    Cardiovascular:  Negative for chest pain, palpitations and leg swelling.   Gastrointestinal:  Negative for abdominal pain, blood in stool, constipation, diarrhea, nausea and vomiting.    Genitourinary:  Negative for dysuria, flank pain, frequency, hematuria and urgency.   Musculoskeletal:  Negative for back pain and joint swelling.        R shoulder pain   Skin:  Negative for rash and wound.   Neurological:  Negative for dizziness, syncope, weakness, light-headedness, numbness and headaches.   Hematological:  Does not bruise/bleed easily.   Psychiatric/Behavioral:  Negative for behavioral problems, hallucinations and suicidal ideas.           Objective     Last Recorded Vitals  BP 97/55 (BP Location: Left arm, Patient Position: Lying) Comment: RN notified  Pulse 81   Temp 35.8 °C (96.5 °F) (Temporal)   Resp 14   Wt 108 kg (238 lb)   SpO2 95%     Image Results  XR shoulder right 2+ views    Result Date: 11/21/2023  STUDY: Shoulder Radiographs; 11/21/2023, 01:50PM INDICATION: Reverse total shoulder arthroplasty. COMPARISON: 10/11/2023 CT shoulder. ACCESSION NUMBER(S): [id number] ORDERING CLINICIAN: KETTY PENNY TECHNIQUE:  Two view(s) of the right shoulder. FINDINGS:  The patient status post reverse total shoulder arthroplasty on the right in anatomic alignment.  There is surgical staples and postoperative air in the soft tissues.  No soft tissue abnormality is seen.    Postop right reverse total shoulder arthroplasty. Signed by Bruno Yi MD    XR chest 2 views    Result Date: 11/16/2023  Interpreted By:  Amadna Cedillo, STUDY: XR CHEST 2 VIEWS; 11/15/2023 1:22 pm   INDICATION: Recheck abnormal lung finding; possible atypical pneumonia; recheck status post treatment   COMPARISON: CT chest 10/11/2023   ACCESSION NUMBER(S): [id number]   ORDERING CLINICIAN: BERNIE LEONARD   TECHNIQUE: Frontal and lateral radiographs of the chest were obtained.   FINDINGS: LINES AND DEVICES: None.   LUNGS: Previously shown right apical ground-glass opacities are not present radiographically. No focal consolidation, pleural effusion, edema or pneumothorax.   CARDIOMEDIASTINAL SILHOUETTE: The  cardiomediastinal silhouette is within normal limits.   OTHER: No acute osseous abnormality.       Resolved right apical pneumonia.   MACRO None   Signed by: Amanda Cedillo 11/16/2023 9:32 PM Dictation workstation:   [id number]    ECG 12 Lead    Result Date: 11/16/2023  Sinus rhythm Nonspecific intraventricular conduction delay Confirmed by Tony Coleman ([id number]) on 11/16/2023 11:27:00 AM       Lab Results  Results for orders placed or performed during the hospital encounter of 11/21/23 (from the past 24 hour(s))   POCT GLUCOSE   Result Value Ref Range    POCT Glucose 465 (H) 74 - 99 mg/dL   Basic metabolic panel   Result Value Ref Range    Glucose 198 (H) 74 - 99 mg/dL    Sodium 135 (L) 136 - 145 mmol/L    Potassium 4.3 3.5 - 5.3 mmol/L    Chloride 100 98 - 107 mmol/L    Bicarbonate 29 21 - 32 mmol/L    Anion Gap 10 10 - 20 mmol/L    Urea Nitrogen 34 (H) 6 - 23 mg/dL    Creatinine 1.61 (H) 0.50 - 1.30 mg/dL    eGFR 50 (L) >60 mL/min/1.73m*2    Calcium 8.6 8.6 - 10.3 mg/dL   CBC POD 1   Result Value Ref Range    WBC 10.3 4.4 - 11.3 x10*3/uL    nRBC 0.0 0.0 - 0.0 /100 WBCs    RBC 3.73 (L) 4.50 - 5.90 x10*6/uL    Hemoglobin 10.3 (L) 13.5 - 17.5 g/dL    Hematocrit 31.3 (L) 41.0 - 52.0 %    MCV 84 80 - 100 fL    MCH 27.6 26.0 - 34.0 pg    MCHC 32.9 32.0 - 36.0 g/dL    RDW 12.8 11.5 - 14.5 %    Platelets 195 150 - 450 x10*3/uL   POCT GLUCOSE   Result Value Ref Range    POCT Glucose 189 (H) 74 - 99 mg/dL   POCT GLUCOSE   Result Value Ref Range    POCT Glucose 262 (H) 74 - 99 mg/dL        Medications  Scheduled medications:  acetaminophen, 650 mg, oral, q6h VENTURA  aspirin, 325 mg, oral, BID  buPROPion XL, 300 mg, oral, q AM  docusate sodium, 100 mg, oral, BID  doxycycline, 100 mg, oral, q12h VENTURA  escitalopram, 20 mg, oral, Daily  gabapentin, 300 mg, oral, BID  insulin lispro, 0-15 Units, subcutaneous, Before meals & nightly  lamoTRIgine, 150 mg, oral, Daily  lisinopril, 10 mg, oral, Daily  QUEtiapine, 200 mg, oral,  Nightly      Continuous medications:     PRN medications:  PRN medications: dextrose 10 % in water (D10W), dextrose 10 % in water (D10W), dextrose, dextrose, glucagon, glucagon, morphine, naloxone, ondansetron ODT **OR** ondansetron, oxyCODONE, oxyCODONE, oxyCODONE     Physical exam  Constitutional:       General: He is awake. He is not in acute distress.     Appearance: Normal appearance. He is well-developed. He is not ill-appearing, toxic-appearing or diaphoretic. Does have a slight twitch to legs  HENT:      Head: Normocephalic and atraumatic.      Right Ear: External ear normal.      Left Ear: External ear normal.      Nose: Nose normal.      Mouth/Throat:      Mouth: Mucous membranes are moist.      Pharynx: No oropharyngeal exudate or posterior oropharyngeal erythema.   Eyes:      General: No scleral icterus.     Extraocular Movements: Extraocular movements intact.      Pupils: Pupils are equal, round, and reactive to light.   Cardiovascular:      Rate and Rhythm: Normal rate and regular rhythm.      Pulses: Normal pulses.      Heart sounds: No murmur heard.     No friction rub. No gallop.   Pulmonary:      Effort: Pulmonary effort is normal. No respiratory distress.      Breath sounds: No wheezing, rhonchi or rales.   Abdominal:      General: There is no distension.      Palpations: Abdomen is soft. There is no hepatomegaly, splenomegaly or mass.      Tenderness: There is no abdominal tenderness.   Musculoskeletal:         General: No deformity or signs of injury.      Cervical back: Normal range of motion and neck supple. No rigidity or tenderness.      Comments: RUE in sling -- bandaging C/D/I. Sensation intact to light touch. Grasping my hand without difficulty. Good cap refill.    Skin:     General: Skin is warm and dry.      Capillary Refill: Capillary refill takes less than 2 seconds.      Coloration: Skin is not pale.      Findings: No erythema, lesion or rash.   Neurological:      General: No focal  deficit present.      Mental Status: He is alert and oriented to person, place, and time.      Cranial Nerves: No cranial nerve deficit.   Psychiatric:         Mood and Affect: Mood normal.         Behavior: Behavior normal. Behavior is cooperative.              Code Status  Full Code     Assessment/Plan      R shoulder arthritis, R rotator cuff tear, biceps tendinitis s/p rTSA 11/21/23   -Pain is very adequately controlled  -Neurovascular status intact  -Postop bandaging is clean dry and intact  -Tolerating meals without difficulty  -PT/OT evaluation in the morning  -Plan for discharge likely in the morning following this evaluation, patient is anxious to discharge as soon as possible in the morning    AMRITA (acute kidney injury)  -Will give 1L IVF bolus  -Patient insistent upon discharge to home  -PVR normal  -Will need close BMP in 3-4 days with PCP     Essential hypertension  -Patient has had elevated blood pressure readings throughout stay in PACU and on transfer to 3 E., suspect a degree of this is due to his pain.  He reports that his blood pressure is typically fairly well-controlled at home.  -Will continue home medications and continue analgesia attempts.  If persistently elevated, can make additional adjustments as needed     IDDM-II   -Continue SSI ACHS  -Confirm and resume home Lantus dosing  -Hypoglycemic protocol  -Monitor and adjust as needed  -Endocrinology: Dr. Del Cid     Jean syndrome   -Following with Dr. Dyer for colonoscopy and management at this time      Anxiety and depression   -Continue home therapies      Chronic pain ([facility])  -Resume home medical marijuana on discharge, close follow-up with prescribers   -Patient reports multiple planned orthopedic interventions to help with his pain in the upcoming year      Please see orders for more complete plan    Bridgette Khan PA-C

## 2023-11-22 NOTE — PROGRESS NOTES
11/22/23 0928   Discharge Planning   Living Arrangements Spouse/significant other   Support Systems Spouse/significant other   Assistance Needed none   Type of Residence Private residence   Patient expects to be discharged to: home     Pt states he is independent at home; occasionally will need to use a cane. He is current with PCP, Dr. Dickson.  Therapy rec for OP PT/OT; wife available to drive. Anticipate DC to home today.

## 2023-11-27 ENCOUNTER — TELEPHONE (OUTPATIENT)
Dept: ORTHOPEDIC SURGERY | Facility: CLINIC | Age: 58
End: 2023-11-27
Payer: COMMERCIAL

## 2023-11-27 DIAGNOSIS — M75.121 COMPLETE TEAR OF RIGHT ROTATOR CUFF, UNSPECIFIED WHETHER TRAUMATIC: ICD-10-CM

## 2023-11-27 DIAGNOSIS — M19.011 ARTHRITIS OF RIGHT SHOULDER REGION: ICD-10-CM

## 2023-11-27 NOTE — TELEPHONE ENCOUNTER
11/21/23 reverse total shoulder  Patient is out of the Oxycodone and it states for him to continue his percocet prescribed by pain management. Pain management states they were not filling anything while he is under Dr. Vidal care. He needs something for pain.    Pain level: 8  [facility] [address] [place] [address]

## 2023-11-28 NOTE — TELEPHONE ENCOUNTER
Pain Management Provider called and states Dr. Otero should continue to treat the patients pain until he releases him with maximum medical improvement in which he then can return to pain management. He recommends the patient be on the Percocet 7mg 2x daily or even 5mg 3x a day. Wanted to speak with Dr. Otero medical staff. Tried reaching out to both you and Enriqueta. If we have any questions we can call his office.    Dr. Diogo Briones [phone]

## 2023-11-29 DIAGNOSIS — E11.65 TYPE 2 DIABETES MELLITUS WITH HYPERGLYCEMIA, UNSPECIFIED WHETHER LONG TERM INSULIN USE (MULTI): Primary | ICD-10-CM

## 2023-11-29 RX ORDER — OXYCODONE HYDROCHLORIDE 5 MG/1
5 TABLET ORAL EVERY 6 HOURS PRN
Qty: 15 TABLET | Refills: 0 | Status: SHIPPED | OUTPATIENT
Start: 2023-11-29 | End: 2023-12-06

## 2023-12-01 DIAGNOSIS — Z79.4 TYPE 2 DIABETES MELLITUS WITH HYPERGLYCEMIA, WITH LONG-TERM CURRENT USE OF INSULIN (MULTI): Primary | ICD-10-CM

## 2023-12-01 DIAGNOSIS — E11.65 TYPE 2 DIABETES MELLITUS WITH HYPERGLYCEMIA, WITH LONG-TERM CURRENT USE OF INSULIN (MULTI): Primary | ICD-10-CM

## 2023-12-01 RX ORDER — BLOOD-GLUCOSE,RECEIVER,CONT
EACH MISCELLANEOUS
Qty: 1 EACH | Refills: 0 | Status: SHIPPED | OUTPATIENT
Start: 2023-12-01 | End: 2024-05-24 | Stop reason: ALTCHOICE

## 2023-12-01 RX ORDER — BLOOD-GLUCOSE SENSOR
EACH MISCELLANEOUS
Qty: 3 EACH | Refills: 11 | Status: SHIPPED | OUTPATIENT
Start: 2023-12-01

## 2023-12-03 ENCOUNTER — PHARMACY VISIT (OUTPATIENT)
Dept: PHARMACY | Facility: CLINIC | Age: 58
End: 2023-12-03
Payer: COMMERCIAL

## 2023-12-03 PROCEDURE — RXMED WILLOW AMBULATORY MEDICATION CHARGE

## 2023-12-03 RX ORDER — BLOOD-GLUCOSE,RECEIVER,CONT
EACH MISCELLANEOUS
Qty: 1 EACH | Refills: 0 | OUTPATIENT
Start: 2023-12-01 | End: 2024-05-24 | Stop reason: WASHOUT

## 2023-12-04 ENCOUNTER — ANCILLARY PROCEDURE (OUTPATIENT)
Dept: RADIOLOGY | Facility: CLINIC | Age: 58
End: 2023-12-04
Payer: COMMERCIAL

## 2023-12-04 ENCOUNTER — OFFICE VISIT (OUTPATIENT)
Dept: ORTHOPEDIC SURGERY | Facility: CLINIC | Age: 58
End: 2023-12-04
Payer: COMMERCIAL

## 2023-12-04 DIAGNOSIS — G89.4 CHRONIC PAIN SYNDROME: ICD-10-CM

## 2023-12-04 DIAGNOSIS — M19.011 ARTHRITIS OF RIGHT SHOULDER REGION: Primary | ICD-10-CM

## 2023-12-04 DIAGNOSIS — M19.011 ARTHRITIS OF RIGHT SHOULDER REGION: ICD-10-CM

## 2023-12-04 PROCEDURE — 4010F ACE/ARB THERAPY RXD/TAKEN: CPT | Performed by: PHYSICIAN ASSISTANT

## 2023-12-04 PROCEDURE — 1036F TOBACCO NON-USER: CPT | Performed by: PHYSICIAN ASSISTANT

## 2023-12-04 PROCEDURE — 3046F HEMOGLOBIN A1C LEVEL >9.0%: CPT | Performed by: PHYSICIAN ASSISTANT

## 2023-12-04 PROCEDURE — 73030 X-RAY EXAM OF SHOULDER: CPT | Mod: RT,FY

## 2023-12-04 PROCEDURE — 73030 X-RAY EXAM OF SHOULDER: CPT | Mod: RIGHT SIDE | Performed by: RADIOLOGY

## 2023-12-04 PROCEDURE — 99024 POSTOP FOLLOW-UP VISIT: CPT | Performed by: PHYSICIAN ASSISTANT

## 2023-12-04 RX ORDER — OXYCODONE HYDROCHLORIDE 5 MG/1
5 TABLET ORAL EVERY 6 HOURS PRN
Qty: 20 TABLET | Refills: 0 | Status: SHIPPED | OUTPATIENT
Start: 2023-12-04 | End: 2023-12-11

## 2023-12-04 NOTE — PROGRESS NOTES
HPI      The patient is 2 weeks status post right reverse total shoulder replacement. The patient has no complaints today and states they are doing well.  Their pain is well controlled on oral pain medications.  They have not had any chest pain, shortness of breath, fevers, chills, or calf tenderness.  The pain from their surgery is gradually improving and they have not had any drainage from the wounds, redness, or any other constitutional symptoms (fevers, chills, feeling 'under the weather', etc.).    Physical examination:    Right shoulder with small hematoma under the incision.  Incision is clean dry intact healing well without drainage.  No erythema or edema.  Right shoulder pain-free range of motion forward flexion to 90 degrees.  Sensation intact to light touch.  Right upper extremity is neurovascularly intact.    X-rays of the patient were ordered and obtained by Dodie Wilcox PA-C today.  The patient's x-ray images were personally viewed by Dodie Wilcox PA-C and the following findings are her personal interpretation: X-rays of right shoulder status post reverse total shoulder replacement with all hardware in proper location    Impression:   2 weeks status post right reverse total shoulder replacement    Plan:  Staples removed. Incision care discussed.  Postoperative plan discussed with the patient.  Patient was given an order for physical therapy.  Follow up in 4 weeks    We also had a very long conversation with the patient and Dr. Otero regarding the extenuating circumstances with the patient's pain management provider.  Essentially the patient's pain management provider has dropped him from his practice in this postoperative period and will not resume his typical pain management medication regiment until he is considered to have achieved maximal medical improvement, which in our eyes would not be until he is at least 6 months postoperatively.  Due to this unusual circumstance which we have never before  experienced, we are recommending to the patient that he establish with a new pain management provider within .  We will place a stat referral for pain management provider.  To bridge the patient during this situation we will give him a second refill of oxycodone 5 mg tablets every 6 hours for severe pain.  All questions answered. Patient should call the office with any further questions or concerns.

## 2024-01-02 DIAGNOSIS — M67.813 BICEPS TENDONOSIS OF RIGHT SHOULDER: ICD-10-CM

## 2024-01-02 DIAGNOSIS — Z96.611 S/P REVERSE TOTAL SHOULDER ARTHROPLASTY, RIGHT: ICD-10-CM

## 2024-01-10 ENCOUNTER — APPOINTMENT (OUTPATIENT)
Dept: PHYSICAL THERAPY | Facility: CLINIC | Age: 59
End: 2024-01-10
Payer: COMMERCIAL

## 2024-01-13 ENCOUNTER — HOSPITAL ENCOUNTER (EMERGENCY)
Facility: HOSPITAL | Age: 59
Discharge: HOME | End: 2024-01-13
Attending: STUDENT IN AN ORGANIZED HEALTH CARE EDUCATION/TRAINING PROGRAM
Payer: COMMERCIAL

## 2024-01-13 VITALS
TEMPERATURE: 98.6 F | DIASTOLIC BLOOD PRESSURE: 89 MMHG | OXYGEN SATURATION: 98 % | SYSTOLIC BLOOD PRESSURE: 177 MMHG | HEART RATE: 71 BPM | BODY MASS INDEX: 35 KG/M2 | WEIGHT: 250 LBS | RESPIRATION RATE: 18 BRPM | HEIGHT: 71 IN

## 2024-01-13 DIAGNOSIS — M54.2 CHRONIC NECK PAIN: Primary | ICD-10-CM

## 2024-01-13 DIAGNOSIS — G89.29 CHRONIC NECK PAIN: Primary | ICD-10-CM

## 2024-01-13 PROCEDURE — 99281 EMR DPT VST MAYX REQ PHY/QHP: CPT | Performed by: STUDENT IN AN ORGANIZED HEALTH CARE EDUCATION/TRAINING PROGRAM

## 2024-01-13 ASSESSMENT — COLUMBIA-SUICIDE SEVERITY RATING SCALE - C-SSRS
6. HAVE YOU EVER DONE ANYTHING, STARTED TO DO ANYTHING, OR PREPARED TO DO ANYTHING TO END YOUR LIFE?: NO
2. HAVE YOU ACTUALLY HAD ANY THOUGHTS OF KILLING YOURSELF?: NO
1. IN THE PAST MONTH, HAVE YOU WISHED YOU WERE DEAD OR WISHED YOU COULD GO TO SLEEP AND NOT WAKE UP?: NO

## 2024-01-13 ASSESSMENT — PAIN SCALES - GENERAL: PAINLEVEL_OUTOF10: 8

## 2024-01-13 ASSESSMENT — PAIN - FUNCTIONAL ASSESSMENT: PAIN_FUNCTIONAL_ASSESSMENT: 0-10

## 2024-01-13 ASSESSMENT — LIFESTYLE VARIABLES
EVER HAD A DRINK FIRST THING IN THE MORNING TO STEADY YOUR NERVES TO GET RID OF A HANGOVER: NO
EVER FELT BAD OR GUILTY ABOUT YOUR DRINKING: NO
HAVE YOU EVER FELT YOU SHOULD CUT DOWN ON YOUR DRINKING: NO
HAVE PEOPLE ANNOYED YOU BY CRITICIZING YOUR DRINKING: NO
REASON UNABLE TO ASSESS: NO

## 2024-01-13 ASSESSMENT — PAIN DESCRIPTION - LOCATION: LOCATION: NECK

## 2024-01-14 NOTE — ED PROVIDER NOTES
"Chief Complaint   Patient presents with    Neck Pain     Sees surgeon end of January for cervical fusion. Wants neck imaging done today as he feels his neck is \"clicking and sticking\".        HPI:  58 y.o. male history of multiple prior orthopedic surgeries presenting to the ED with neck clicking.  He has had chronic neck pain for some time but noticed recently he was hearing it click.  He is not having any increased pain, and no new numbness or tingling.  No new injuries.  He is otherwise asymptomatic.  He already has appointment to see spine surgery and was hoping to get an x-ray in advance.    History provided by: patient  Limitations to history: none    ------------------------------------------------------------------------------------------------------------------------------------------    ED Triage Vitals [01/13/24 1829]   Temp Heart Rate Resp BP   37 °C (98.6 °F) 71 18 177/89      SpO2 Temp Source Heart Rate Source Patient Position   98 % Temporal Monitor Sitting      BP Location FiO2 (%)     Left arm --          Physical Exam:  Triage vitals reviewed.  Constitutional: Well developed adult in no acute distress, non toxic in appearance  Head: Normocephalic, atraumatic  Skin: Intact, dry. No rashes or lesions.  Eyes: Pupils are equal. No conjunctival injection.  Neck: Supple. Trachea is midline.  No midline tenderness.  Full ROM without limitations.  Pulmonary: Normal work of breathing with no accessory muscle use noted.  Clear to auscultation bilaterally.   Cardiovascular: RRR. 2+ radial pulses bilaterally.   Extremities: No gross deformities.  Moving all extremities spontaneously without difficulty.  Full  strength forearm flexion/extension bilaterally.  Sensation to light touch intact.  Neuro: Awake and alert. Face is symmetric.  Speech is clear. No obvious focal findings.  Psych: Appropriate mood and " affect.    ------------------------------------------------------------------------------------------------------------------------------------------    Medical Decision Making:  This patient is a 58 y.o. male with a history of multiple prior orthopedic surgeries who is presenting to the ED for neck x-rays.  He has had a clicking in his neck but no other new pain, injuries, or neurologic symptoms.  Explained to patient that usually x-rays are performed per spine surgery but without an acute injury, there is low concern for life-threatening process that would require x-rays of the cervical spine.  He expressed understanding and already has an appointment to see spine surgery.  He has no other acute concerns today and will follow-up with them.  Discharged in stable condition.    Diagnoses as of 01/13/24 1903   Chronic neck pain        Clinical Impression:  Chronic neck pain    Disposition:  Discharge to home    Kristina Rico DO  Emergency Medicine         Kristina Rico DO  02/07/24 3674

## 2024-01-15 ENCOUNTER — OFFICE VISIT (OUTPATIENT)
Dept: ORTHOPEDIC SURGERY | Facility: CLINIC | Age: 59
End: 2024-01-15
Payer: COMMERCIAL

## 2024-01-15 ENCOUNTER — EVALUATION (OUTPATIENT)
Dept: PHYSICAL THERAPY | Facility: CLINIC | Age: 59
End: 2024-01-15
Payer: COMMERCIAL

## 2024-01-15 ENCOUNTER — ANCILLARY PROCEDURE (OUTPATIENT)
Dept: RADIOLOGY | Facility: CLINIC | Age: 59
End: 2024-01-15
Payer: COMMERCIAL

## 2024-01-15 VITALS — HEIGHT: 71 IN | BODY MASS INDEX: 35 KG/M2 | WEIGHT: 250 LBS

## 2024-01-15 DIAGNOSIS — M25.611 DECREASED ROM OF RIGHT SHOULDER: Primary | ICD-10-CM

## 2024-01-15 DIAGNOSIS — M75.121 COMPLETE TEAR OF RIGHT ROTATOR CUFF, UNSPECIFIED WHETHER TRAUMATIC: Primary | ICD-10-CM

## 2024-01-15 DIAGNOSIS — M67.813 BICEPS TENDONOSIS OF RIGHT SHOULDER: ICD-10-CM

## 2024-01-15 DIAGNOSIS — M19.011 ARTHRITIS OF RIGHT SHOULDER REGION: ICD-10-CM

## 2024-01-15 DIAGNOSIS — Z96.611 S/P REVERSE TOTAL SHOULDER ARTHROPLASTY, RIGHT: ICD-10-CM

## 2024-01-15 PROCEDURE — 97110 THERAPEUTIC EXERCISES: CPT | Mod: GP | Performed by: PHYSICAL THERAPIST

## 2024-01-15 PROCEDURE — 97161 PT EVAL LOW COMPLEX 20 MIN: CPT | Mod: GP | Performed by: PHYSICAL THERAPIST

## 2024-01-15 PROCEDURE — 73030 X-RAY EXAM OF SHOULDER: CPT | Mod: RT

## 2024-01-15 PROCEDURE — 73030 X-RAY EXAM OF SHOULDER: CPT | Mod: RIGHT SIDE | Performed by: RADIOLOGY

## 2024-01-15 PROCEDURE — 1036F TOBACCO NON-USER: CPT | Performed by: PHYSICIAN ASSISTANT

## 2024-01-15 PROCEDURE — 4010F ACE/ARB THERAPY RXD/TAKEN: CPT | Performed by: PHYSICIAN ASSISTANT

## 2024-01-15 PROCEDURE — 99024 POSTOP FOLLOW-UP VISIT: CPT | Performed by: PHYSICIAN ASSISTANT

## 2024-01-15 ASSESSMENT — ENCOUNTER SYMPTOMS
DEPRESSION: 0
OCCASIONAL FEELINGS OF UNSTEADINESS: 1

## 2024-01-15 ASSESSMENT — PATIENT HEALTH QUESTIONNAIRE - PHQ9
2. FEELING DOWN, DEPRESSED OR HOPELESS: NOT AT ALL
SUM OF ALL RESPONSES TO PHQ9 QUESTIONS 1 AND 2: 0
1. LITTLE INTEREST OR PLEASURE IN DOING THINGS: NOT AT ALL

## 2024-01-15 ASSESSMENT — PAIN - FUNCTIONAL ASSESSMENT: PAIN_FUNCTIONAL_ASSESSMENT: 0-10

## 2024-01-15 ASSESSMENT — PAIN SCALES - GENERAL
PAINLEVEL_OUTOF10: 0 - NO PAIN
PAINLEVEL_OUTOF10: 0 - NO PAIN

## 2024-01-15 NOTE — LETTER
January 15, 2024     Patient: Louis Viera   YOB: 1965   Date of Visit: 1/15/2024       To Whom It May Concern:    It is my medical opinion that Louis Viera  may be released from prescription medications from , he may resume pain management care with your office .    If you have any questions or concerns, please don't hesitate to call.         Sincerely,        Dodie Wilcox PA-C    CC: No Recipients

## 2024-01-15 NOTE — PROGRESS NOTES
"Physical Therapy    Physical Therapy Evaluation and Treatment      Patient Name: Louis Viera \"Jose\"  MRN: [id number]  Today's Date: 1/15/2024  Time Calculation  Start Time: 0245  Stop Time: 0340  Time Calculation (min): 55 min    Assessment:    Pt presents s/p right reverse TSA with decreased right shoulder ROM, strength and function. Pt needs skilled PT to  address above problems.    Plan:  OP PT Plan  Treatment/Interventions: Cryotherapy, Education/ Instruction, Hot pack, Manual therapy, Therapeutic exercises  PT Plan: Skilled PT  PT Frequency: 2 times per week  Onset Date: 11/21/23  Rehab Potential: Excellent  Plan of Care Agreement: Patient    Current Problem:   1. Decreased ROM of right shoulder        2. Arthritis of right shoulder region  Referral to Physical Therapy          Subjective    Pt presents s/p right reverse TSA on 11/21/2023. Pt is 8 weeks post op. Pt reports that overall he is doing very well and is sleeping pretty well. Pt reports that he wakes up occasionally due to neck pain and he is scheduled to have a neck fusion in the future. Pt reports that he is still having trouble reaching overhead with his right arm. Pt is on disability and not working at this time.    Pt's goal: Good motion and use of right shoulder    General  Referred By: Dr. Otero  Precautions:  Precautions  STEADI Fall Risk Score (The score of 4 or more indicates an increased risk of falling): 7  Precautions Comment: see protocol       Pain:  Pain Assessment  Pain Score: 0 - No pain  Pain Location: Shoulder  Pain Orientation: Right  Home Living:   Pt lives with his wife  Prior Level of Function:   Independent    Objective   Cognition:   WNL  General Assessments:   Shoulder AROM  R Shoulder flexion: (180°): 110  R shoulder abduction: (180°): 95  Shoulder PROM  R shoulder flexion: (180°): 155  R shoulder ER: (90°): 60  Cervical Myotomes (MMT     Shoulder Strength  R shoulder flexion: (5/5): 2+/5  L shoulder abduction: (5/5): " 2+/5  R shoulder ER: (5/5): 3+/5  R shoulder IR: (5/5) : 3+/5        Outcome Measures:  Other Measures  Disability of Arm Shoulder Hand (DASH): 30 (raw)     Treatments:  EXERCISE Date 1/15/24 Date Date Date    REPS REPS REPS REPS          PROM flexion and ER  10X ea             Pulleys      Flexion       Pulleys      Scaption/Abduction       Pulleys      IR              Tband       Pull down       Tband       Mid rows       Tband       IR       Tband       ER                     Wand-supine shoulder flexion 10 X 2      Wand- supine shoulder ER 10 X 2             Finger ladder-flexion 10X                                                                                               EDUCATION:   Access Code: [email or web address]  URL: [email or web address]  Date: 01/15/2024  Prepared by: Raquel Wilkinson    Exercises  - Supine Shoulder Flexion Extension AAROM with Dowel  - 1-2 x daily - 7 x weekly - 2 sets - 10 reps  - Supine Shoulder External Rotation with Dowel (Mirrored)  - 1-2 x daily - 7 x weekly - 2 sets - 10 reps    Goals:  1. Pt will have at least 120 degrees of active shoulder flexion and scaption to improve independence with daily activities-8 weeks    2.Pt will have at least 4/5 right UE so that he can perform moderate household chores without difficulty-8 weeks    3.Improve Quickdash score < 20 (raw score) to facilitate return to PLOF-8 weeks    4.Pt will be independent with HEP-4-6 weeks

## 2024-01-15 NOTE — PROGRESS NOTES
History of Present Illness  No chief complaint on file.      58 y.o. male is 6 weeks status post right reverse total shoulder replacement.  They state pain is well controlled and continuing to improve. State they are continuing to progress well.  He is starting physical therapy this week.    Review of Systems   GENERAL: Negative for malaise, significant weight loss, fever, chills  MUSCULOSKELETAL: see HPI  NEURO:  Negative    Exam  Right shoulder incisions are clean dry intact well-healed.  Shoulder range of motion forward flexion abduction to 100 degrees, externally rotates 45 degrees, internally rotates to the sacrum.  SILT. UE is NVI.    X-rays of the patient were ordered and obtained by Dodie Wilcox PA-C today.  The patient's x-ray images were personally viewed by Dodie Wilcox PA-C and the following findings are her personal interpretation: X-rays of the right shoulder status post reverse total shoulder placement with all hardware in proper location    Assessment  Patient is 6 weeks status post right reverse total shoulder replacement, progressing well    Plan  We have the patient continue with physical therapy working on strengthening and range of motion of the right shoulder.  He can slowly continue increasing activities as tolerated.  I will see them back in 6 weeks for recheck, sooner if there is any problems.  While the patient was in the office today I also spoke with the office staff from his pain management physician, we will give him a release from our office stating that we are no longer prescribing him any narcotic pain medication so he can resume care with his pain management physician.  All questions were answered. Patient should call the office with any further questions or concerns.

## 2024-01-26 ENCOUNTER — DOCUMENTATION (OUTPATIENT)
Dept: PHYSICAL THERAPY | Facility: CLINIC | Age: 59
End: 2024-01-26
Payer: COMMERCIAL

## 2024-01-26 ENCOUNTER — APPOINTMENT (OUTPATIENT)
Dept: PHYSICAL THERAPY | Facility: CLINIC | Age: 59
End: 2024-01-26
Payer: COMMERCIAL

## 2024-01-26 NOTE — PROGRESS NOTES
Physical Therapy                 Therapy Communication Note    Patient Name: Louis Viera  MRN: [id number]  :1965  Today's Date: 2024     Discipline: Physical Therapy    Missed Visit Reason:  Patient: Canceled via automated reminder system     Missed Time: Cancel    Comment:

## 2024-01-29 ENCOUNTER — TRANSCRIBE ORDERS (OUTPATIENT)
Dept: ORTHOPEDIC SURGERY | Facility: HOSPITAL | Age: 59
End: 2024-01-29
Payer: COMMERCIAL

## 2024-01-29 DIAGNOSIS — M54.2 NECK PAIN: ICD-10-CM

## 2024-01-29 PROCEDURE — RXMED WILLOW AMBULATORY MEDICATION CHARGE

## 2024-01-30 ENCOUNTER — APPOINTMENT (OUTPATIENT)
Dept: RADIOLOGY | Facility: CLINIC | Age: 59
End: 2024-01-30
Payer: COMMERCIAL

## 2024-01-30 ENCOUNTER — APPOINTMENT (OUTPATIENT)
Dept: ORTHOPEDIC SURGERY | Facility: CLINIC | Age: 59
End: 2024-01-30
Payer: COMMERCIAL

## 2024-02-01 ENCOUNTER — DOCUMENTATION (OUTPATIENT)
Dept: PHYSICAL THERAPY | Facility: CLINIC | Age: 59
End: 2024-02-01
Payer: COMMERCIAL

## 2024-02-01 NOTE — PROGRESS NOTES
Physical Therapy                 Therapy Communication Note    Patient Name: Louis Viera  MRN: [id number]  :1965  Today's Date: 2024     Discipline: Physical Therapy    Missed Visit Reason:      Missed Time: No Show    Comment:

## 2024-02-05 ENCOUNTER — DOCUMENTATION (OUTPATIENT)
Dept: PHYSICAL THERAPY | Facility: CLINIC | Age: 59
End: 2024-02-05
Payer: COMMERCIAL

## 2024-02-05 NOTE — PROGRESS NOTES
Physical Therapy                 Therapy Communication Note    Patient Name: Louis Viera  MRN: [id number]  Today's Date: 2/5/2024     Discipline: Physical Therapy    Missed Visit Reason:      Missed Time: No Show    Comment:

## 2024-02-07 ENCOUNTER — DOCUMENTATION (OUTPATIENT)
Dept: PHYSICAL THERAPY | Facility: CLINIC | Age: 59
End: 2024-02-07
Payer: COMMERCIAL

## 2024-02-07 NOTE — PROGRESS NOTES
Physical Therapy                 Therapy Communication Note    Patient Name: Louis Viera  MRN: [id number]  :1965  Today's Date: 2024     Discipline: Physical Therapy    Missed Visit Reason:      Missed Time: No Show    Comment:

## 2024-02-10 ENCOUNTER — PHARMACY VISIT (OUTPATIENT)
Dept: PHARMACY | Facility: CLINIC | Age: 59
End: 2024-02-10
Payer: COMMERCIAL

## 2024-02-12 ENCOUNTER — DOCUMENTATION (OUTPATIENT)
Dept: PHYSICAL THERAPY | Facility: CLINIC | Age: 59
End: 2024-02-12
Payer: COMMERCIAL

## 2024-02-12 NOTE — PROGRESS NOTES
Physical Therapy                 Therapy Communication Note    Patient Name: Louis Viera  MRN: [id number]  :1965  Today's Date: 2024     Discipline: Physical Therapy    Missed Visit Reason:  Patient no showed visit. We removed remaining 3 visits from schedule. Hold discharge at this time until hearing back from patient to see if further PT is needed.     Missed Time: No Show    Comment:

## 2024-02-14 ENCOUNTER — DOCUMENTATION (OUTPATIENT)
Dept: PHYSICAL THERAPY | Facility: CLINIC | Age: 59
End: 2024-02-14
Payer: COMMERCIAL

## 2024-02-14 NOTE — PROGRESS NOTES
Physical Therapy                 Therapy Communication Note    Patient Name: Louis Viera  MRN: [id number]  Today's Date: 2/14/2024     Discipline: Physical Therapy    Missed Visit Reason:      Missed Time: No Show    Comment:

## 2024-02-15 ENCOUNTER — TELEMEDICINE (OUTPATIENT)
Dept: PHARMACY | Facility: HOSPITAL | Age: 59
End: 2024-02-15
Payer: COMMERCIAL

## 2024-02-15 DIAGNOSIS — E11.65 TYPE 2 DIABETES MELLITUS WITH HYPERGLYCEMIA, UNSPECIFIED WHETHER LONG TERM INSULIN USE (MULTI): Primary | ICD-10-CM

## 2024-02-15 NOTE — PROGRESS NOTES
Louis Viera is a 58 y.o. male was referred to Clinical Pharmacy Team to complete a comprehensive medication review (CMR) with a pharmacist as part of the [employer] diabetes program.  The patient was referred for their Diabetes.    Referring Provider: Marlys Dickson DO    Subjective   Allergies   Allergen Reactions    Armodafinil Palpitations     tachycardia    Fentanyl Palpitations and Other     sweating, tachycardia        [id number] - [place], [place] - [address] [address] AT [address]  [address] [address]  Phone: [phone] Fax: [phone]    [facility] - [place] Camilo - [facility] AT [address] [facility]  [place] [address]  Phone: [phone] Fax: [phone]     [facility]  [address] [facility] [address]  Phone: [phone] Fax: [phone]      Diabetes  He presents for his follow-up diabetic visit. He has type 2 diabetes mellitus. There are no hypoglycemic associated symptoms. There are no hypoglycemic complications. Risk factors for coronary artery disease include male sex, sedentary lifestyle, diabetes mellitus and obesity. Current diabetic treatment includes insulin injections.       Objective     There were no vitals taken for this visit.     LAB  Lab Results   Component Value Date    BILITOT 0.5 03/14/2023    CALCIUM 8.6 11/22/2023    CO2 29 11/22/2023     11/22/2023    CREATININE 1.61 (H) 11/22/2023    GLUCOSE 198 (H) 11/22/2023    ALKPHOS 118 03/14/2023    K 4.3 11/22/2023    PROT 7.7 03/14/2023     (L) 11/22/2023    AST 14 03/14/2023    ALT 14 03/14/2023    BUN 34 (H) 11/22/2023    ANIONGAP 10 11/22/2023    ALBUMIN 4.5 03/14/2023    LIPASE 26 09/30/2020    GFRMALE >90 04/27/2023     Lab Results   Component Value Date    TRIG 81 04/27/2023    CHOL 145 04/27/2023    HDL 65.8 04/27/2023     Lab  "Results   Component Value Date    HGBA1C 10.1 (A) 04/27/2023       Current Outpatient Medications on File Prior to Visit   Medication Sig Dispense Refill    BABY ASPIRIN ORAL Take 81 mg by mouth once daily.      blood sugar diagnostic strip       blood-glucose meter (Accu-Chek Guide Glucose Meter) [facility] Use to test blood sugar twice daily      blood-glucose sensor (Dexcom G7 Sensor) device For continuous glucose monitoring.  Change every 10 days. 3 each 11    blood-glucose sensor device Change every 10 days for continuous glucose monitoring 3 each 11    buPROPion XL (Wellbutrin XL) 150 mg 24 hr tablet Take 1 tablet (150 mg) by mouth once daily in the morning.      buPROPion XL (Wellbutrin XL) 300 mg 24 hr tablet Take 1 tablet (300 mg) by mouth once daily in the morning.      Dexcom G4 platinum  (Dexcom G7 ) misc Use as instructed 1 each 0    dulaglutide (TRULICITY) 4.5 mg/0.5 mL pen injector INJECT 4.5MG SUBCUTANEOUSLY EVERY WEEK 2 mL 3    escitalopram (Lexapro) 20 mg tablet Take 1 tablet (20 mg) by mouth once daily.      insulin aspart (NovoLOG) 100 unit/mL (3 mL) pen INJECT UP TO 40 UNITS DAILY PER SLIDING SCALE AS DIRECTED 45 mL 0    insulin detemir (Levemir FlexPen) 100 unit/mL (3 mL) pen Inject 60 Units under the skin 2 times a day. 36 mL 5    lamoTRIgine (LaMICtal) 150 mg tablet Take 1 tablet (150 mg) by mouth once daily.      lancets (Accu-Chek Fastclix Lancet Drum) [facility] Testing twice daily      MULTIVITAMIN ORAL Take 1 tablet by mouth once daily.      oxyCODONE-acetaminophen (Percocet) 7.5-325 mg tablet Take 1 tablet by mouth 3 times a day.      pen needle, diabetic 32 gauge x 5/32\" needle TO BE USED WITH INSULIN 5 TIMES PER  each 11    polyethylene glycol-electrolytes (polyethylene glycol) 420 gram solution Take by mouth. TAKE AS DIRECTED.      acetaminophen (Tylenol) 500 mg tablet Take 2 tablets (1,000 mg) by mouth every 8 hours if needed for mild pain (1 - 3) (Take 2 tablets every 8 " hours for 5 days, then after 5 days take 2 tablets as needed for pain). 90 tablet 0    buprenorphine-naloxone (Suboxone) 8-2 mg SL film Place 1 Film under the tongue once daily.      Dexcom G4 platinum  (Dexcom G7 ) misc Use as instructed 1 each 0    dulaglutide (Trulicity) 3 mg/0.5 mL pen injector Inject 3 mg under the skin 1 (one) time per week.      hydrOXYzine HCL (Atarax) 10 mg tablet Take 1 tablet (10 mg) by mouth 3 times a day as needed.      insulin detemir (Levemir) 100 unit/mL injection Inject 58 Units under the skin 2 times a day.      lisinopril 10 mg tablet Take 1 tablet (10 mg) by mouth once daily.      medical cannabis (NOT [facility] PRESCRIBED) DO NOT FILL      QUEtiapine (SEROquel) 50 mg tablet Take 3 tablets (150 mg) by mouth once daily at bedtime. AS DIRECTED FOR SLEEP       No current facility-administered medications on file prior to visit.        HISTORICAL PHARMACOTHERAPY  -N/A    DRUG INTERACTIONS  - N/A    SECONDARY PREVENTION  - Statin? no  - ACE-I/ARB? no    Assessment/Plan   Problem List Items Addressed This Visit       Type 2 diabetes mellitus with hyperglycemia (CMS/[facility]) - Primary     Scheduled an appointment for Diabetes management  Encouraged to watch over his diet and exercise   HbA1c is 10.1 % which is not at goal, and the goal HbA1c is at < 7%  Current medication:  Trulicity 4.5 mg / 0.5 mL : INJECT 4.5MG SUBCUTANEOUSLY EVERY WEEK   Novolog 100 units / mL: INJECT UP TO 40 UNITS DAILY PER SLIDING SCALE AS DIRECTED   Insulin Levemir Pen: Inject 60 Units under the skin 2 times a day            Relevant Orders    Referral to Clinical Pharmacy          Follow up: 10 months    Continue all meds under the continuation of care with the referring provider and clinical pharmacy team.    Estefania Gil, Cherie     Verbal consent to manage patient's drug therapy was obtained from the patient . They were informed they may decline to participate or withdraw from participation in  pharmacy services at any time.

## 2024-02-15 NOTE — ASSESSMENT & PLAN NOTE
Scheduled an appointment for Diabetes management  Encouraged to watch over his diet and exercise   HbA1c is 10.1 % which is not at goal, and the goal HbA1c is at < 7%  Current medication:  Trulicity 4.5 mg / 0.5 mL : INJECT 4.5MG SUBCUTANEOUSLY EVERY WEEK   Novolog 100 units / mL: INJECT UP TO 40 UNITS DAILY PER SLIDING SCALE AS DIRECTED   Insulin Levemir Pen: Inject 60 Units under the skin 2 times a day

## 2024-02-19 ENCOUNTER — APPOINTMENT (OUTPATIENT)
Dept: PHYSICAL THERAPY | Facility: CLINIC | Age: 59
End: 2024-02-19
Payer: COMMERCIAL

## 2024-02-21 ENCOUNTER — APPOINTMENT (OUTPATIENT)
Dept: PHYSICAL THERAPY | Facility: CLINIC | Age: 59
End: 2024-02-21
Payer: COMMERCIAL

## 2024-02-26 ENCOUNTER — APPOINTMENT (OUTPATIENT)
Dept: GASTROENTEROLOGY | Facility: HOSPITAL | Age: 59
End: 2024-02-26
Payer: COMMERCIAL

## 2024-03-06 ENCOUNTER — APPOINTMENT (OUTPATIENT)
Dept: PHARMACY | Facility: HOSPITAL | Age: 59
End: 2024-03-06
Payer: COMMERCIAL

## 2024-03-11 ENCOUNTER — APPOINTMENT (OUTPATIENT)
Dept: PHARMACY | Facility: HOSPITAL | Age: 59
End: 2024-03-11
Payer: COMMERCIAL

## 2024-04-19 ENCOUNTER — APPOINTMENT (OUTPATIENT)
Dept: OPHTHALMOLOGY | Facility: CLINIC | Age: 59
End: 2024-04-19
Payer: COMMERCIAL

## 2024-04-23 ENCOUNTER — PHARMACY VISIT (OUTPATIENT)
Dept: PHARMACY | Facility: CLINIC | Age: 59
End: 2024-04-23
Payer: COMMERCIAL

## 2024-04-23 PROCEDURE — RXMED WILLOW AMBULATORY MEDICATION CHARGE

## 2024-04-24 ENCOUNTER — APPOINTMENT (OUTPATIENT)
Dept: PRIMARY CARE | Facility: CLINIC | Age: 59
End: 2024-04-24
Payer: COMMERCIAL

## 2024-04-25 ENCOUNTER — OFFICE VISIT (OUTPATIENT)
Dept: PRIMARY CARE | Facility: CLINIC | Age: 59
End: 2024-04-25
Payer: COMMERCIAL

## 2024-04-25 ENCOUNTER — ANESTHESIA EVENT (OUTPATIENT)
Dept: GASTROENTEROLOGY | Facility: HOSPITAL | Age: 59
End: 2024-04-25
Payer: COMMERCIAL

## 2024-04-25 VITALS
OXYGEN SATURATION: 96 % | TEMPERATURE: 96.3 F | SYSTOLIC BLOOD PRESSURE: 126 MMHG | HEART RATE: 96 BPM | BODY MASS INDEX: 34.03 KG/M2 | WEIGHT: 244 LBS | DIASTOLIC BLOOD PRESSURE: 72 MMHG

## 2024-04-25 DIAGNOSIS — Z86.14 HISTORY OF MRSA INFECTION: ICD-10-CM

## 2024-04-25 DIAGNOSIS — J34.89 NOSTRIL INFECTION: Primary | ICD-10-CM

## 2024-04-25 DIAGNOSIS — B35.6 TINEA CRURIS: ICD-10-CM

## 2024-04-25 PROCEDURE — 3078F DIAST BP <80 MM HG: CPT | Performed by: PHYSICIAN ASSISTANT

## 2024-04-25 PROCEDURE — 3074F SYST BP LT 130 MM HG: CPT | Performed by: PHYSICIAN ASSISTANT

## 2024-04-25 PROCEDURE — 99214 OFFICE O/P EST MOD 30 MIN: CPT | Performed by: PHYSICIAN ASSISTANT

## 2024-04-25 PROCEDURE — 4010F ACE/ARB THERAPY RXD/TAKEN: CPT | Performed by: PHYSICIAN ASSISTANT

## 2024-04-25 RX ORDER — GABAPENTIN 300 MG/1
300 CAPSULE ORAL 2 TIMES DAILY
COMMUNITY
Start: 2024-04-07

## 2024-04-25 RX ORDER — KETOCONAZOLE 20 MG/G
CREAM TOPICAL 2 TIMES DAILY
Qty: 60 G | Refills: 0 | Status: SHIPPED | OUTPATIENT
Start: 2024-04-25 | End: 2025-04-25

## 2024-04-25 RX ORDER — MUPIROCIN 20 MG/G
OINTMENT TOPICAL 3 TIMES DAILY
Qty: 22 G | Refills: 0 | Status: SHIPPED | OUTPATIENT
Start: 2024-04-25

## 2024-04-25 NOTE — PROGRESS NOTES
"Subjective   Patient ID: Louis Viera is a 58 y.o. male who presents for MRSA (Discuss reoccurent staph infection in nose x\"off and on\" x\"months\". Pt states PM will not see him without a PCP OK).    HPI   Patient complains of nostril infection that he gets periodically. States that he has had cultures that grew MRSA in the past from this area. He and his daughter have both tested positive for MRSA.  No sore throat. No fevers/chills.  Had some leftover mupirocin that he started using the past few days.   Also has rash in his groin the past couple weeks. Itches and burns.       Seeing pain mgmt for BWC related injury.      reports that he quit smoking about 31 years ago. His smoking use included cigarettes. He has never used smokeless tobacco.    Review of Systems   Constitutional:  Negative for fatigue.   HENT:  Negative for congestion.        Objective   /72   Pulse 96   Temp 35.7 °C (96.3 °F)   Wt 111 kg (244 lb)   SpO2 96%   BMI 34.03 kg/m²     Physical Exam  Vitals and nursing note reviewed.   HENT:      Head: Normocephalic.      Nose:      Comments: Left nostril with mild erythema and flakiness of the skin at the opening of the left nares.  No significant nose/face swelling.   Eyes:      General: No scleral icterus.  Cardiovascular:      Rate and Rhythm: Normal rate and regular rhythm.   Pulmonary:      Effort: Pulmonary effort is normal.      Breath sounds: Normal breath sounds.   Skin:     General: Skin is warm and dry.      Comments: Left groin crease with erythema and slight scaling along the borders consistent with tinea.    Neurological:      Mental Status: He is alert.   Psychiatric:         Mood and Affect: Affect normal.         Assessment/Plan   Diagnoses and all orders for this visit:  Tinea cruris  -     ketoconazole (NIZOral) 2 % cream; Apply topically 2 times a day.  Nostril infection  -     mupirocin (Bactroban) 2 % ointment; Apply topically 3 times a day. apply to affected " area  History of MRSA infection  -     mupirocin (Bactroban) 2 % ointment; Apply topically 3 times a day. apply to affected area         Rx Ketoconazole to apply to groin rash.  Use Hibiclens to wash entire body daily x 5 days.   Use Mupirocin intranasally 3x per day x 7 days.   Consider probiotic use.   Follow up if symptoms increase or persist.

## 2024-04-25 NOTE — PATIENT INSTRUCTIONS
Use Hibiclens to wash entire body daily x 5 days.   Use Mupirocin intranasally 3x per day x 7 days.   Use ketoconazole cream in groin for probable tinea cruris.

## 2024-04-29 ENCOUNTER — HOSPITAL ENCOUNTER (OUTPATIENT)
Dept: GASTROENTEROLOGY | Facility: HOSPITAL | Age: 59
Discharge: HOME | End: 2024-04-29
Payer: COMMERCIAL

## 2024-04-29 ENCOUNTER — ANESTHESIA (OUTPATIENT)
Dept: GASTROENTEROLOGY | Facility: HOSPITAL | Age: 59
End: 2024-04-29
Payer: COMMERCIAL

## 2024-04-29 VITALS
TEMPERATURE: 97.3 F | WEIGHT: 244 LBS | DIASTOLIC BLOOD PRESSURE: 87 MMHG | SYSTOLIC BLOOD PRESSURE: 133 MMHG | RESPIRATION RATE: 12 BRPM | BODY MASS INDEX: 34.16 KG/M2 | OXYGEN SATURATION: 94 % | HEART RATE: 73 BPM | HEIGHT: 71 IN

## 2024-04-29 DIAGNOSIS — R10.84 GENERALIZED ABDOMINAL PAIN: ICD-10-CM

## 2024-04-29 DIAGNOSIS — D48.9 NEOPLASM OF UNCERTAIN BEHAVIOR: ICD-10-CM

## 2024-04-29 DIAGNOSIS — Z80.0 FAMILY HISTORY OF COLON CANCER: ICD-10-CM

## 2024-04-29 DIAGNOSIS — Z15.09 LYNCH SYNDROME: ICD-10-CM

## 2024-04-29 DIAGNOSIS — R10.84 ABDOMINAL PAIN, GENERALIZED: ICD-10-CM

## 2024-04-29 PROBLEM — Z86.69 PERSONAL HISTORY OF OTHER DISEASES OF THE NERVOUS SYSTEM AND SENSE ORGANS: Status: ACTIVE | Noted: 2024-04-29

## 2024-04-29 PROBLEM — R11.0 NAUSEA: Status: ACTIVE | Noted: 2024-04-29

## 2024-04-29 PROBLEM — Z12.11 ENCOUNTER FOR SCREENING FOR MALIGNANT NEOPLASM OF COLON: Status: ACTIVE | Noted: 2024-04-29

## 2024-04-29 PROBLEM — E11.9 DIABETES MELLITUS (MULTI): Status: ACTIVE | Noted: 2024-04-29

## 2024-04-29 PROBLEM — Z87.19 PERSONAL HISTORY OF OTHER DISEASES OF THE DIGESTIVE SYSTEM: Status: ACTIVE | Noted: 2024-04-29

## 2024-04-29 LAB — GLUCOSE BLD MANUAL STRIP-MCNC: 157 MG/DL (ref 74–99)

## 2024-04-29 PROCEDURE — 2500000004 HC RX 250 GENERAL PHARMACY W/ HCPCS (ALT 636 FOR OP/ED)

## 2024-04-29 PROCEDURE — 2500000004 HC RX 250 GENERAL PHARMACY W/ HCPCS (ALT 636 FOR OP/ED): Performed by: SURGERY

## 2024-04-29 PROCEDURE — 2500000005 HC RX 250 GENERAL PHARMACY W/O HCPCS

## 2024-04-29 PROCEDURE — 3700000001 HC GENERAL ANESTHESIA TIME - INITIAL BASE CHARGE

## 2024-04-29 PROCEDURE — 3700000002 HC GENERAL ANESTHESIA TIME - EACH INCREMENTAL 1 MINUTE

## 2024-04-29 PROCEDURE — 88305 TISSUE EXAM BY PATHOLOGIST: CPT | Mod: TC,59,PORLAB | Performed by: SURGERY

## 2024-04-29 PROCEDURE — 82947 ASSAY GLUCOSE BLOOD QUANT: CPT

## 2024-04-29 PROCEDURE — 7100000009 HC PHASE TWO TIME - INITIAL BASE CHARGE

## 2024-04-29 PROCEDURE — 43235 EGD DIAGNOSTIC BRUSH WASH: CPT | Performed by: SURGERY

## 2024-04-29 PROCEDURE — 7100000010 HC PHASE TWO TIME - EACH INCREMENTAL 1 MINUTE

## 2024-04-29 PROCEDURE — 45380 COLONOSCOPY AND BIOPSY: CPT | Performed by: SURGERY

## 2024-04-29 PROCEDURE — 88305 TISSUE EXAM BY PATHOLOGIST: CPT | Performed by: PATHOLOGY

## 2024-04-29 RX ORDER — SODIUM CHLORIDE 9 MG/ML
20 INJECTION, SOLUTION INTRAVENOUS CONTINUOUS
Status: DISCONTINUED | OUTPATIENT
Start: 2024-04-29 | End: 2024-04-30 | Stop reason: HOSPADM

## 2024-04-29 RX ORDER — PROPOFOL 10 MG/ML
INJECTION, EMULSION INTRAVENOUS AS NEEDED
Status: DISCONTINUED | OUTPATIENT
Start: 2024-04-29 | End: 2024-04-29

## 2024-04-29 RX ORDER — SODIUM CHLORIDE 9 MG/ML
20 INJECTION, SOLUTION INTRAVENOUS CONTINUOUS
Status: CANCELLED | OUTPATIENT
Start: 2024-04-29

## 2024-04-29 RX ORDER — LIDOCAINE HYDROCHLORIDE 20 MG/ML
INJECTION, SOLUTION EPIDURAL; INFILTRATION; INTRACAUDAL; PERINEURAL AS NEEDED
Status: DISCONTINUED | OUTPATIENT
Start: 2024-04-29 | End: 2024-04-29

## 2024-04-29 RX ADMIN — LIDOCAINE HYDROCHLORIDE 100 MG: 20 INJECTION, SOLUTION EPIDURAL; INFILTRATION; INTRACAUDAL; PERINEURAL at 10:39

## 2024-04-29 RX ADMIN — PROPOFOL 50 MG: 10 INJECTION, EMULSION INTRAVENOUS at 11:22

## 2024-04-29 RX ADMIN — PROPOFOL 50 MG: 10 INJECTION, EMULSION INTRAVENOUS at 11:07

## 2024-04-29 RX ADMIN — PROPOFOL 50 MG: 10 INJECTION, EMULSION INTRAVENOUS at 11:10

## 2024-04-29 RX ADMIN — PROPOFOL 50 MG: 10 INJECTION, EMULSION INTRAVENOUS at 10:49

## 2024-04-29 RX ADMIN — PROPOFOL 50 MG: 10 INJECTION, EMULSION INTRAVENOUS at 10:47

## 2024-04-29 RX ADMIN — PROPOFOL 50 MG: 10 INJECTION, EMULSION INTRAVENOUS at 10:45

## 2024-04-29 RX ADMIN — PROPOFOL 50 MG: 10 INJECTION, EMULSION INTRAVENOUS at 10:39

## 2024-04-29 RX ADMIN — PROPOFOL 50 MG: 10 INJECTION, EMULSION INTRAVENOUS at 10:55

## 2024-04-29 RX ADMIN — PROPOFOL 50 MG: 10 INJECTION, EMULSION INTRAVENOUS at 11:02

## 2024-04-29 RX ADMIN — PROPOFOL 50 MG: 10 INJECTION, EMULSION INTRAVENOUS at 10:57

## 2024-04-29 RX ADMIN — PROPOFOL 50 MG: 10 INJECTION, EMULSION INTRAVENOUS at 10:53

## 2024-04-29 RX ADMIN — PROPOFOL 50 MG: 10 INJECTION, EMULSION INTRAVENOUS at 10:51

## 2024-04-29 RX ADMIN — PROPOFOL 50 MG: 10 INJECTION, EMULSION INTRAVENOUS at 10:43

## 2024-04-29 RX ADMIN — PROPOFOL 50 MG: 10 INJECTION, EMULSION INTRAVENOUS at 11:05

## 2024-04-29 RX ADMIN — PROPOFOL 50 MG: 10 INJECTION, EMULSION INTRAVENOUS at 10:41

## 2024-04-29 RX ADMIN — SODIUM CHLORIDE 20 ML/HR: 9 INJECTION, SOLUTION INTRAVENOUS at 10:15

## 2024-04-29 SDOH — HEALTH STABILITY: MENTAL HEALTH: CURRENT SMOKER: 0

## 2024-04-29 ASSESSMENT — PAIN SCALES - GENERAL
PAIN_LEVEL: 0
PAINLEVEL_OUTOF10: 0 - NO PAIN
PAINLEVEL_OUTOF10: 0 - NO PAIN
PAINLEVEL_OUTOF10: 7
PAINLEVEL_OUTOF10: 0 - NO PAIN
PAINLEVEL_OUTOF10: 0 - NO PAIN

## 2024-04-29 ASSESSMENT — COLUMBIA-SUICIDE SEVERITY RATING SCALE - C-SSRS
6. HAVE YOU EVER DONE ANYTHING, STARTED TO DO ANYTHING, OR PREPARED TO DO ANYTHING TO END YOUR LIFE?: NO
1. IN THE PAST MONTH, HAVE YOU WISHED YOU WERE DEAD OR WISHED YOU COULD GO TO SLEEP AND NOT WAKE UP?: NO
2. HAVE YOU ACTUALLY HAD ANY THOUGHTS OF KILLING YOURSELF?: NO

## 2024-04-29 ASSESSMENT — PAIN - FUNCTIONAL ASSESSMENT: PAIN_FUNCTIONAL_ASSESSMENT: 0-10

## 2024-04-29 NOTE — ANESTHESIA PREPROCEDURE EVALUATION
Patient: Louis Viera    Procedure Information       Date/Time: 04/29/24 1015    Scheduled providers: Gustavo Dyer MD    Procedures:       EGD      COLONOSCOPY    Location:  [facility]            Relevant Problems   Anesthesia (within normal limits)      Cardiac   (+) Benign essential hypertension   (+) Hyperlipidemia   (+) PSVT (paroxysmal supraventricular tachycardia) (CMS-[facility])      Pulmonary   (+) Obstructive sleep apnea syndrome      Neuro   (+) Anxiety state   (+) Depression, recurrent (CMS-[facility])   (+) Generalized anxiety disorder   (+) Personal history of other diseases of the nervous system and sense organs      Endocrine   (+) Diabetes mellitus (Multi)   (+) Long-term insulin use (Multi)   (+) Morbid obesity (Multi)   (+) Type 2 diabetes mellitus with hyperglycemia (Multi)      Musculoskeletal   (+) Arthritis of right shoulder region   (+) Arthritis of shoulder region, right   (+) Biceps tendinitis of right shoulder   (+) Chronic pain syndrome   (+) Complete tear of right rotator cuff   (+) Decreased ROM of right shoulder   (+) Degenerative disc disease, cervical   (+) Melanocytic nevi of unspecified lower limb, including hip   (+) Melanocytic nevi of unspecified upper limb, including shoulder   (+) Primary osteoarthritis of right knee   (+) Traumatic arthropathy of ankle      Skin   (+) Actinic keratosis   (+) Hemangioma of skin and subcutaneous tissue   (+) Melanocytic nevi of other parts of face   (+) Melanocytic nevi of trunk   (+) Other melanin hyperpigmentation   (+) Skin changes due to chronic exposure to nonionizing radiation, unspecified      Nervous   (+) Insomnia      Circulatory   (+) Benign hypertensive heart disease      Digestive   (+) Esophagitis   (+) Nausea   (+) Polyp of colon      Genitourinary   (+) AMRITA (acute kidney injury) (CMS-[facility])   (+) Functional impotence   (+) Psychogenic impotence      Gastrointestinal and Abdominal   (+) Encounter for screening for  malignant neoplasm of colon   (+) Personal history of other diseases of the digestive system      Hematology and Neoplasia   (+) Genetic susceptibility to other malignant neoplasm   (+) MSH2-related Jean syndrome (HNPCC1)   (+) Melanocytic nevi, unspecified   (+) Neoplasm of uncertain behavior of skin   (+) Personal history of malignant melanoma of skin       Clinical information reviewed:   Tobacco  Allergies  Meds   Med Hx  Surg Hx   Fam Hx  Soc Hx        NPO Detail:  NPO/Void Status  Date of Last Liquid: 04/28/24  Time of Last Liquid: 1730  Date of Last Solid: 04/27/24  Last Intake Type: Clear fluids         Physical Exam    Airway  Mallampati: III  TM distance: >3 FB  Neck ROM: limited     Cardiovascular - normal exam     Dental - normal exam     Pulmonary - normal exam     Abdominal            Anesthesia Plan    History of general anesthesia?: yes  History of complications of general anesthesia?: no    ASA 3     MAC     The patient is not a current smoker.  Patient did not smoke on day of procedure.    intravenous induction   Anesthetic plan and risks discussed with patient.

## 2024-04-29 NOTE — ANESTHESIA POSTPROCEDURE EVALUATION
Patient: Louis Viera    Procedure Summary       Date: 04/29/24 Room / Location: [facility]    Anesthesia Start: 1036 Anesthesia Stop: 1141    Procedures:       EGD      COLONOSCOPY Diagnosis:       Generalized abdominal pain      Jean syndrome      Abdominal pain, generalized      Family history of colon cancer      Neoplasm of uncertain behavior    Scheduled Providers: Gustavo Dyer MD Responsible Provider: GANESH Lopez    Anesthesia Type: MAC ASA Status: 3            Anesthesia Type: MAC    Vitals Value Taken Time   /87 04/29/24 1209   Temp 36.3 °C (97.3 °F) 04/29/24 1209   Pulse 73 04/29/24 1209   Resp 12 04/29/24 1209   SpO2 94 % 04/29/24 1209       Anesthesia Post Evaluation    Patient location during evaluation: PACU  Patient participation: complete - patient participated  Level of consciousness: awake and alert  Pain score: 0  Pain management: adequate  Airway patency: patent  Cardiovascular status: acceptable  Respiratory status: acceptable  Hydration status: acceptable  Postoperative Nausea and Vomiting: none        No notable events documented.

## 2024-04-30 ASSESSMENT — ENCOUNTER SYMPTOMS: FATIGUE: 0

## 2024-05-06 LAB
LABORATORY COMMENT REPORT: NORMAL
PATH REPORT.FINAL DX SPEC: NORMAL
PATH REPORT.GROSS SPEC: NORMAL
PATH REPORT.RELEVANT HX SPEC: NORMAL
PATH REPORT.TOTAL CANCER: NORMAL

## 2024-05-07 ENCOUNTER — PHARMACY VISIT (OUTPATIENT)
Dept: PHARMACY | Facility: CLINIC | Age: 59
End: 2024-05-07
Payer: COMMERCIAL

## 2024-05-07 PROCEDURE — RXMED WILLOW AMBULATORY MEDICATION CHARGE

## 2024-05-07 RX ORDER — DULAGLUTIDE 4.5 MG/.5ML
INJECTION, SOLUTION SUBCUTANEOUS
Qty: 2 ML | Refills: 3 | OUTPATIENT
Start: 2024-05-07 | End: 2025-05-07

## 2024-05-07 RX ORDER — DULAGLUTIDE 4.5 MG/.5ML
INJECTION, SOLUTION SUBCUTANEOUS
Qty: 2 ML | Refills: 3 | Status: CANCELLED | OUTPATIENT
Start: 2024-05-07 | End: 2025-05-07

## 2024-05-07 NOTE — TELEPHONE ENCOUNTER
Patient not seen in more than one year.  No future appointments are scheduled.  Refills will not be granted at this time.

## 2024-05-14 ENCOUNTER — TRANSCRIBE ORDERS (OUTPATIENT)
Dept: ORTHOPEDIC SURGERY | Facility: HOSPITAL | Age: 59
End: 2024-05-14
Payer: COMMERCIAL

## 2024-05-14 DIAGNOSIS — M54.2 NECK PAIN: ICD-10-CM

## 2024-05-17 ENCOUNTER — APPOINTMENT (OUTPATIENT)
Dept: PRIMARY CARE | Facility: CLINIC | Age: 59
End: 2024-05-17
Payer: COMMERCIAL

## 2024-05-20 ENCOUNTER — APPOINTMENT (OUTPATIENT)
Dept: RADIOLOGY | Facility: CLINIC | Age: 59
End: 2024-05-20
Payer: COMMERCIAL

## 2024-05-20 ENCOUNTER — OFFICE VISIT (OUTPATIENT)
Dept: ORTHOPEDIC SURGERY | Facility: CLINIC | Age: 59
End: 2024-05-20
Payer: COMMERCIAL

## 2024-05-20 VITALS — WEIGHT: 220 LBS | HEIGHT: 71 IN | BODY MASS INDEX: 30.8 KG/M2

## 2024-05-20 DIAGNOSIS — M54.12 CERVICAL RADICULOPATHY: ICD-10-CM

## 2024-05-20 PROCEDURE — 99203 OFFICE O/P NEW LOW 30 MIN: CPT | Performed by: ORTHOPAEDIC SURGERY

## 2024-05-20 PROCEDURE — 4010F ACE/ARB THERAPY RXD/TAKEN: CPT | Performed by: ORTHOPAEDIC SURGERY

## 2024-05-20 ASSESSMENT — PAIN - FUNCTIONAL ASSESSMENT: PAIN_FUNCTIONAL_ASSESSMENT: 0-10

## 2024-05-21 NOTE — PROGRESS NOTES
HPI:Louis Viera is a 58-year-old man, comes in today with complaints of weakness in his hands left greater than right.  He has some atrophy in his first dorsal webspace.  He has some tingling in the ulnar distribution on his left hand.  The patient denies any specific problems with balance.  He has not had any recent physical therapy for his neck.      ROS:  Reviewed on EMR and patient intake sheet.    PMH/SH:   Reviewed on EMR and patient intake sheet.    Exam:  Physical Exam    Constitutional: Well appearing; no acute distress  Eyes: pupils are equal and round  Psych: normal affect  Respiratory: non-labored breathing  Cardiovascular: regular rate and rhythm  GI: non-distended abdomen  Musculoskeletal: no pain with range of motion of the shoulders bilaterally; no signs of impingement  Neurologic: [4+]/5 strength in the upper extremities bilaterally]; [negative] Zamudio's; [no hyper-reflexia]    Radiology:  MRI of the cervical spine in 2019 demonstrates some moderate canal and foraminal narrowing at C5-6 and C6-C7    Diagnosis:  Cervical radiculopathy    Assessment and Plan:   58-year-old man, with cervical radiculopathy.  At this time we will begin with some physical therapy.  However if symptoms do not improve, then he would need an MRI of the cervical spine to assess for stenosis followed by an EMG to assess for any peripheral nerve entrapment contributing to the significant atrophy in his left upper extremity.  I can see him back after these imaging studies should his symptoms persist despite therapy    The patient was in agreement with the plan. At the end of the visit today, the patient felt that all questions had been answered satisfactorily.  The patient was pleased with the visit and very appreciative for the care rendered.     Thank you very much for the kind referral.  It is a privilege, and a pleasure, to partner with you in the care of your patients.  I would be delighted to assist you with any  further consultations as needed.      Eriberto Denson MD    Chief of Spine Surgery, [facility]  Director of Spine Service, [facility]  , Department of Orthopaedics  [facility] [employer]  [address] [place] [address]  P: [phone]  [facility]    This note was dictated with voice recognition software.  It has not been proofread for grammatical errors, typographical mistakes or other semantic inconsistencies.

## 2024-05-24 ENCOUNTER — OFFICE VISIT (OUTPATIENT)
Dept: PRIMARY CARE | Facility: CLINIC | Age: 59
End: 2024-05-24
Payer: COMMERCIAL

## 2024-05-24 VITALS
DIASTOLIC BLOOD PRESSURE: 80 MMHG | SYSTOLIC BLOOD PRESSURE: 152 MMHG | BODY MASS INDEX: 33.75 KG/M2 | WEIGHT: 242 LBS | HEART RATE: 99 BPM | OXYGEN SATURATION: 97 % | TEMPERATURE: 97.4 F

## 2024-05-24 DIAGNOSIS — Z11.4 SCREENING FOR HIV (HUMAN IMMUNODEFICIENCY VIRUS): ICD-10-CM

## 2024-05-24 DIAGNOSIS — Z23 NEED FOR TDAP VACCINATION: ICD-10-CM

## 2024-05-24 DIAGNOSIS — E11.65 TYPE 2 DIABETES MELLITUS WITH HYPERGLYCEMIA, UNSPECIFIED WHETHER LONG TERM INSULIN USE (MULTI): ICD-10-CM

## 2024-05-24 DIAGNOSIS — Z23 NEED FOR PNEUMOCOCCAL VACCINE: ICD-10-CM

## 2024-05-24 DIAGNOSIS — Z11.59 NEED FOR HEPATITIS C SCREENING TEST: ICD-10-CM

## 2024-05-24 DIAGNOSIS — K59.09 CHRONIC CONSTIPATION: ICD-10-CM

## 2024-05-24 DIAGNOSIS — E78.5 HYPERLIPIDEMIA, UNSPECIFIED HYPERLIPIDEMIA TYPE: ICD-10-CM

## 2024-05-24 DIAGNOSIS — Z00.00 WELLNESS EXAMINATION: Primary | ICD-10-CM

## 2024-05-24 DIAGNOSIS — S91.302A OPEN WND OF FOOT, LEFT, INITIAL ENCOUNTER: ICD-10-CM

## 2024-05-24 DIAGNOSIS — J34.0 CELLULITIS OF NOSE: ICD-10-CM

## 2024-05-24 DIAGNOSIS — I10 BENIGN ESSENTIAL HYPERTENSION: ICD-10-CM

## 2024-05-24 PROBLEM — D22.70 MELANOCYTIC NEVI OF UNSPECIFIED LOWER LIMB, INCLUDING HIP: Status: RESOLVED | Noted: 2022-04-04 | Resolved: 2024-05-24

## 2024-05-24 PROBLEM — D22.39 MELANOCYTIC NEVI OF OTHER PARTS OF FACE: Status: RESOLVED | Noted: 2022-04-04 | Resolved: 2024-05-24

## 2024-05-24 PROBLEM — R11.0 NAUSEA: Status: RESOLVED | Noted: 2024-04-29 | Resolved: 2024-05-24

## 2024-05-24 PROBLEM — D22.5 MELANOCYTIC NEVI OF TRUNK: Status: RESOLVED | Noted: 2022-04-04 | Resolved: 2024-05-24

## 2024-05-24 PROBLEM — N17.9 AKI (ACUTE KIDNEY INJURY) (CMS-HCC): Status: RESOLVED | Noted: 2023-11-22 | Resolved: 2024-05-24

## 2024-05-24 PROBLEM — L81.4 OTHER MELANIN HYPERPIGMENTATION: Status: RESOLVED | Noted: 2022-04-04 | Resolved: 2024-05-24

## 2024-05-24 PROBLEM — Z87.19 PERSONAL HISTORY OF OTHER DISEASES OF THE DIGESTIVE SYSTEM: Status: RESOLVED | Noted: 2024-04-29 | Resolved: 2024-05-24

## 2024-05-24 PROBLEM — F41.1 ANXIETY STATE: Status: RESOLVED | Noted: 2023-10-20 | Resolved: 2024-05-24

## 2024-05-24 PROBLEM — K20.90 ESOPHAGITIS: Status: RESOLVED | Noted: 2023-10-20 | Resolved: 2024-05-24

## 2024-05-24 PROBLEM — Z86.69 PERSONAL HISTORY OF OTHER DISEASES OF THE NERVOUS SYSTEM AND SENSE ORGANS: Status: RESOLVED | Noted: 2024-04-29 | Resolved: 2024-05-24

## 2024-05-24 PROBLEM — D48.5 NEOPLASM OF UNCERTAIN BEHAVIOR OF SKIN: Status: RESOLVED | Noted: 2022-04-04 | Resolved: 2024-05-24

## 2024-05-24 PROBLEM — D22.60 MELANOCYTIC NEVI OF UNSPECIFIED UPPER LIMB, INCLUDING SHOULDER: Status: RESOLVED | Noted: 2022-04-04 | Resolved: 2024-05-24

## 2024-05-24 PROBLEM — M25.611 DECREASED ROM OF RIGHT SHOULDER: Status: RESOLVED | Noted: 2024-01-15 | Resolved: 2024-05-24

## 2024-05-24 PROCEDURE — 3077F SYST BP >= 140 MM HG: CPT | Performed by: FAMILY MEDICINE

## 2024-05-24 PROCEDURE — 1036F TOBACCO NON-USER: CPT | Performed by: FAMILY MEDICINE

## 2024-05-24 PROCEDURE — 4010F ACE/ARB THERAPY RXD/TAKEN: CPT | Performed by: FAMILY MEDICINE

## 2024-05-24 PROCEDURE — 99396 PREV VISIT EST AGE 40-64: CPT | Performed by: FAMILY MEDICINE

## 2024-05-24 PROCEDURE — 90715 TDAP VACCINE 7 YRS/> IM: CPT | Performed by: FAMILY MEDICINE

## 2024-05-24 PROCEDURE — 90677 PCV20 VACCINE IM: CPT | Performed by: FAMILY MEDICINE

## 2024-05-24 PROCEDURE — 90472 IMMUNIZATION ADMIN EACH ADD: CPT | Performed by: FAMILY MEDICINE

## 2024-05-24 PROCEDURE — 99214 OFFICE O/P EST MOD 30 MIN: CPT | Performed by: FAMILY MEDICINE

## 2024-05-24 PROCEDURE — 90471 IMMUNIZATION ADMIN: CPT | Performed by: FAMILY MEDICINE

## 2024-05-24 PROCEDURE — 3079F DIAST BP 80-89 MM HG: CPT | Performed by: FAMILY MEDICINE

## 2024-05-24 RX ORDER — CLINDAMYCIN HYDROCHLORIDE 300 MG/1
300 CAPSULE ORAL 3 TIMES DAILY
Qty: 21 CAPSULE | Refills: 0 | Status: SHIPPED | OUTPATIENT
Start: 2024-05-24 | End: 2024-05-31

## 2024-05-24 RX ORDER — LISINOPRIL 20 MG/1
20 TABLET ORAL DAILY
Qty: 90 TABLET | Refills: 1 | Status: SHIPPED | OUTPATIENT
Start: 2024-05-24 | End: 2025-06-28

## 2024-05-24 RX ORDER — CELECOXIB 200 MG/1
200 CAPSULE ORAL DAILY
COMMUNITY
Start: 2024-04-30 | End: 2024-05-24 | Stop reason: ALTCHOICE

## 2024-05-24 RX ORDER — METHOCARBAMOL 750 MG/1
750 TABLET, FILM COATED ORAL DAILY
COMMUNITY
Start: 2024-04-30

## 2024-05-24 RX ORDER — AMOXICILLIN 250 MG
2 CAPSULE ORAL 2 TIMES DAILY
Qty: 120 TABLET | Refills: 5 | Status: SHIPPED | OUTPATIENT
Start: 2024-05-24 | End: 2025-05-24

## 2024-05-24 ASSESSMENT — ENCOUNTER SYMPTOMS
CONSTIPATION: 0
SHORTNESS OF BREATH: 0
ABDOMINAL PAIN: 0
PALPITATIONS: 0
VOMITING: 0
RHINORRHEA: 0
SORE THROAT: 0
COUGH: 0
DYSURIA: 0
DIARRHEA: 0
FATIGUE: 0
WOUND: 1
NAUSEA: 0
CHILLS: 0
FEVER: 0
APPETITE CHANGE: 0

## 2024-05-24 NOTE — PROGRESS NOTES
Subjective   Patient ID: Louis Viera is a 58 y.o. male who presents for Hypertension and Insomnia (recheck).    Louis still having infection in nostrils. Is red, painful. Was given rx for mupirocin one month ago but still has not cleared up. Is red, burning and irritated. His daughter has MRSA and was exposed.     He also is experiencing constipation on regular basis. No blood in stool. Recently had colonoscopy done. Was using docusate with limited improvement.     Also has lesion on left foot. Is raised, hard. He has been picking at it. It sometimes bleeds.    Taking lisinopril daily. Home Bps still running 130s systolic.     Following with endocrinology for diabetes.            Review of Systems   Constitutional:  Negative for appetite change, chills, fatigue and fever.   HENT:  Negative for congestion, rhinorrhea and sore throat.    Respiratory:  Negative for cough and shortness of breath.    Cardiovascular:  Negative for chest pain and palpitations.   Gastrointestinal:  Negative for abdominal pain, constipation, diarrhea, nausea and vomiting.   Genitourinary:  Negative for dysuria.   Skin:  Positive for wound. Negative for rash.       Objective   /80   Pulse 99   Temp 36.3 °C (97.4 °F)   Wt 110 kg (242 lb)   SpO2 97%   BMI 33.75 kg/m²     Physical Exam  Constitutional:       General: He is not in acute distress.     Appearance: Normal appearance.   HENT:      Head: Normocephalic.      Nose: No congestion.      Mouth/Throat:      Mouth: Mucous membranes are moist.   Eyes:      Extraocular Movements: Extraocular movements intact.      Conjunctiva/sclera: Conjunctivae normal.   Cardiovascular:      Rate and Rhythm: Normal rate and regular rhythm.      Heart sounds: No murmur heard.  Pulmonary:      Effort: Pulmonary effort is normal.      Breath sounds: No wheezing or rhonchi.   Abdominal:      Palpations: Abdomen is soft.      Tenderness: There is no abdominal tenderness.   Musculoskeletal:          General: No swelling or tenderness.   Skin:     General: Skin is warm and dry.      Comments: Erythema at bilateral nostrils.    Neurological:      General: No focal deficit present.      Mental Status: He is alert.   Psychiatric:         Mood and Affect: Mood normal.         Behavior: Behavior normal.         Assessment/Plan   Diagnoses and all orders for this visit:  Wellness examination  Comments:  Routine HIV and hepatitis C screening ordered. Tdap and Prevnar-20 given today.  Cellulitis of nose  Comments:  Due to MRSA histoy and exposure, start clindamycin. If not clearing, consider adding anti-fungal cream.  Orders:  -     clindamycin (Cleocin) 300 mg capsule; Take 1 capsule (300 mg) by mouth 3 times a day for 7 days.  Chronic constipation  Comments:  Start senna-docusate.  Orders:  -     sennosides-docusate sodium (Senna with Docusate Sodium) 8.6-50 mg tablet; Take 2 tablets by mouth 2 times a day.  Open wnd of foot, left, initial encounter  Comments:  Large callous on lateral mid-foot with erythema and open area. Refer to podiatry.  Orders:  -     Referral to Podiatry; Future  Type 2 diabetes mellitus with hyperglycemia, unspecified whether long term insulin use (Multi)  Comments:  Management per endocrinology.  Orders:  -     CBC and Auto Differential; Future  -     CBC and Auto Differential; Future  -     Comprehensive Metabolic Panel; Future  Benign essential hypertension  Comments:  Increase lisinopril to 20mg.  Orders:  -     lisinopril 20 mg tablet; Take 1 tablet (20 mg) by mouth once daily.  -     Comprehensive Metabolic Panel; Future  Hyperlipidemia, unspecified hyperlipidemia type  -     Lipid Panel; Future  -     Lipid Panel; Future  Need for pneumococcal vaccine  -     Pneumococcal conjugate vaccine, 20-valent (PREVNAR 20)  Need for Tdap vaccination  -     Tdap vaccine, age 7 years and older  Screening for HIV (human immunodeficiency virus)  -     HIV 1/2 Antigen/Antibody Screen with Reflex to  Confirmation; Future  Need for hepatitis C screening test  -     Hepatitis C Antibody; Future

## 2024-06-05 ENCOUNTER — TELEPHONE (OUTPATIENT)
Dept: PRIMARY CARE | Facility: CLINIC | Age: 59
End: 2024-06-05

## 2024-06-05 ENCOUNTER — LAB (OUTPATIENT)
Dept: LAB | Facility: LAB | Age: 59
End: 2024-06-05
Payer: COMMERCIAL

## 2024-06-05 DIAGNOSIS — Z11.4 SCREENING FOR HIV (HUMAN IMMUNODEFICIENCY VIRUS): ICD-10-CM

## 2024-06-05 DIAGNOSIS — Z11.59 NEED FOR HEPATITIS C SCREENING TEST: ICD-10-CM

## 2024-06-05 DIAGNOSIS — E11.65 TYPE 2 DIABETES MELLITUS WITH HYPERGLYCEMIA, UNSPECIFIED WHETHER LONG TERM INSULIN USE (MULTI): ICD-10-CM

## 2024-06-05 DIAGNOSIS — I10 BENIGN ESSENTIAL HYPERTENSION: ICD-10-CM

## 2024-06-05 DIAGNOSIS — E78.5 HYPERLIPIDEMIA, UNSPECIFIED HYPERLIPIDEMIA TYPE: ICD-10-CM

## 2024-06-05 LAB
ALBUMIN SERPL BCP-MCNC: 4.1 G/DL (ref 3.4–5)
ALP SERPL-CCNC: 109 U/L (ref 33–120)
ALT SERPL W P-5'-P-CCNC: 12 U/L (ref 10–52)
ANION GAP SERPL CALC-SCNC: 10 MMOL/L (ref 10–20)
AST SERPL W P-5'-P-CCNC: 9 U/L (ref 9–39)
BASOPHILS # BLD AUTO: 0.01 X10*3/UL (ref 0–0.1)
BASOPHILS NFR BLD AUTO: 0.2 %
BILIRUB SERPL-MCNC: 0.5 MG/DL (ref 0–1.2)
BUN SERPL-MCNC: 18 MG/DL (ref 6–23)
CALCIUM SERPL-MCNC: 9.6 MG/DL (ref 8.6–10.3)
CHLORIDE SERPL-SCNC: 93 MMOL/L (ref 98–107)
CHOLEST SERPL-MCNC: 158 MG/DL (ref 0–199)
CHOLESTEROL/HDL RATIO: 2.6
CO2 SERPL-SCNC: 34 MMOL/L (ref 21–32)
CREAT SERPL-MCNC: 0.93 MG/DL (ref 0.5–1.3)
EGFRCR SERPLBLD CKD-EPI 2021: >90 ML/MIN/1.73M*2
EOSINOPHIL # BLD AUTO: 0.16 X10*3/UL (ref 0–0.7)
EOSINOPHIL NFR BLD AUTO: 3 %
ERYTHROCYTE [DISTWIDTH] IN BLOOD BY AUTOMATED COUNT: 12.6 % (ref 11.5–14.5)
GLUCOSE SERPL-MCNC: 491 MG/DL (ref 74–99)
HCT VFR BLD AUTO: 38.9 % (ref 41–52)
HDLC SERPL-MCNC: 61.9 MG/DL
HGB BLD-MCNC: 12.8 G/DL (ref 13.5–17.5)
IMM GRANULOCYTES # BLD AUTO: 0.01 X10*3/UL (ref 0–0.7)
IMM GRANULOCYTES NFR BLD AUTO: 0.2 % (ref 0–0.9)
LDLC SERPL CALC-MCNC: 65 MG/DL
LYMPHOCYTES # BLD AUTO: 2.06 X10*3/UL (ref 1.2–4.8)
LYMPHOCYTES NFR BLD AUTO: 38.4 %
MCH RBC QN AUTO: 27.8 PG (ref 26–34)
MCHC RBC AUTO-ENTMCNC: 32.9 G/DL (ref 32–36)
MCV RBC AUTO: 84 FL (ref 80–100)
MONOCYTES # BLD AUTO: 0.36 X10*3/UL (ref 0.1–1)
MONOCYTES NFR BLD AUTO: 6.7 %
NEUTROPHILS # BLD AUTO: 2.76 X10*3/UL (ref 1.2–7.7)
NEUTROPHILS NFR BLD AUTO: 51.5 %
NON HDL CHOLESTEROL: 96 MG/DL (ref 0–149)
NRBC BLD-RTO: 0 /100 WBCS (ref 0–0)
PLATELET # BLD AUTO: 239 X10*3/UL (ref 150–450)
POTASSIUM SERPL-SCNC: 4.8 MMOL/L (ref 3.5–5.3)
PROT SERPL-MCNC: 6.7 G/DL (ref 6.4–8.2)
RBC # BLD AUTO: 4.61 X10*6/UL (ref 4.5–5.9)
SODIUM SERPL-SCNC: 132 MMOL/L (ref 136–145)
TRIGL SERPL-MCNC: 155 MG/DL (ref 0–149)
VLDL: 31 MG/DL (ref 0–40)
WBC # BLD AUTO: 5.4 X10*3/UL (ref 4.4–11.3)

## 2024-06-05 PROCEDURE — 80053 COMPREHEN METABOLIC PANEL: CPT

## 2024-06-05 PROCEDURE — 87389 HIV-1 AG W/HIV-1&-2 AB AG IA: CPT

## 2024-06-05 PROCEDURE — 86803 HEPATITIS C AB TEST: CPT

## 2024-06-05 PROCEDURE — 85025 COMPLETE CBC W/AUTO DIFF WBC: CPT

## 2024-06-05 PROCEDURE — 36415 COLL VENOUS BLD VENIPUNCTURE: CPT

## 2024-06-05 PROCEDURE — 80061 LIPID PANEL: CPT

## 2024-06-05 NOTE — PROGRESS NOTES
Notified by lab that pt blood glucose is in the 400’s. Attempted to call patient to review labs. Of note, pts diabetes is managed by endocrinology.

## 2024-06-06 LAB
HCV AB SER QL: NONREACTIVE
HIV 1+2 AB+HIV1 P24 AG SERPL QL IA: NONREACTIVE

## 2024-06-06 NOTE — TELEPHONE ENCOUNTER
Pt called back and was informed of provider message. Per pt he was not aware he to fast so he ate cereal and had pop before hand. Pt aware to contact Endo and let them know. Thanks. ANAYELI

## 2024-06-11 NOTE — TELEPHONE ENCOUNTER
Please let him know that other labs are stable. Anemia has improved. His triglycerides are still mildly elevated. Reduce dairy, processed foods in the diet to improve. His hepatitis C and HIV screening tests were negative.

## 2024-06-24 ENCOUNTER — DOCUMENTATION (OUTPATIENT)
Dept: PHYSICAL THERAPY | Facility: CLINIC | Age: 59
End: 2024-06-24
Payer: COMMERCIAL

## 2024-06-24 NOTE — PROGRESS NOTES
"Physical Therapy                 Therapy Communication Note    Patient Name: Louis Viera \"Bill\"  MRN: [id number]  Today's Date: 6/24/2024     Discipline: Physical Therapy    Missed Visit Reason:      Missed Time: No Show    Comment: Patient did not show for his scheduled PT evaluation for his neck.  "

## 2024-07-09 PROCEDURE — RXMED WILLOW AMBULATORY MEDICATION CHARGE

## 2024-07-09 RX ORDER — DULAGLUTIDE 4.5 MG/.5ML
INJECTION, SOLUTION SUBCUTANEOUS
Qty: 2 ML | Refills: 3 | Status: CANCELLED | OUTPATIENT
Start: 2024-07-09 | End: 2025-07-09

## 2024-07-10 RX ORDER — DULAGLUTIDE 4.5 MG/.5ML
INJECTION, SOLUTION SUBCUTANEOUS
Qty: 2 ML | Refills: 3 | Status: CANCELLED | OUTPATIENT
Start: 2024-07-09 | End: 2025-07-09

## 2024-07-15 RX ORDER — DULAGLUTIDE 4.5 MG/.5ML
INJECTION, SOLUTION SUBCUTANEOUS
Qty: 2 ML | Refills: 3 | OUTPATIENT
Start: 2024-07-15 | End: 2025-07-15

## 2024-07-19 ENCOUNTER — PHARMACY VISIT (OUTPATIENT)
Dept: PHARMACY | Facility: CLINIC | Age: 59
End: 2024-07-19
Payer: COMMERCIAL

## 2024-07-19 DIAGNOSIS — E11.65 TYPE 2 DIABETES MELLITUS WITH HYPERGLYCEMIA, UNSPECIFIED WHETHER LONG TERM INSULIN USE (MULTI): Primary | ICD-10-CM

## 2024-07-19 PROCEDURE — RXMED WILLOW AMBULATORY MEDICATION CHARGE

## 2024-07-19 NOTE — TELEPHONE ENCOUNTER
Please clarify what dose this patient is taking. Rx is for 4.5m,g. He is saying that he is taking 3mg? Or 1.5mg?

## 2024-07-19 NOTE — TELEPHONE ENCOUNTER
Patient states Dr. Quiles office told him to call his PCP to call this medicine in for him  dulaglutide (Trulicity) 3 mg/0.5 mL pen injector his is using 1.5 send to  [facility]    Dr. Quiles has been on Vacation patient sugar is really high. Please Advise

## 2024-07-22 ENCOUNTER — APPOINTMENT (OUTPATIENT)
Dept: ENDOCRINOLOGY | Facility: CLINIC | Age: 59
End: 2024-07-22
Payer: COMMERCIAL

## 2024-07-22 DIAGNOSIS — Z79.4 TYPE 2 DIABETES MELLITUS WITH HYPERGLYCEMIA, WITH LONG-TERM CURRENT USE OF INSULIN (MULTI): Primary | ICD-10-CM

## 2024-07-22 DIAGNOSIS — E11.65 TYPE 2 DIABETES MELLITUS WITH HYPERGLYCEMIA, WITH LONG-TERM CURRENT USE OF INSULIN (MULTI): Primary | ICD-10-CM

## 2024-08-16 ENCOUNTER — APPOINTMENT (OUTPATIENT)
Dept: OPHTHALMOLOGY | Facility: CLINIC | Age: 59
End: 2024-08-16
Payer: COMMERCIAL

## 2024-08-16 DIAGNOSIS — H52.4 MYOPIA OF BOTH EYES WITH ASTIGMATISM AND PRESBYOPIA: Primary | ICD-10-CM

## 2024-08-16 DIAGNOSIS — H52.13 MYOPIA OF BOTH EYES WITH ASTIGMATISM AND PRESBYOPIA: Primary | ICD-10-CM

## 2024-08-16 DIAGNOSIS — E11.3393 MODERATE NONPROLIFERATIVE DIABETIC RETINOPATHY OF BOTH EYES WITHOUT MACULAR EDEMA ASSOCIATED WITH TYPE 2 DIABETES MELLITUS (MULTI): ICD-10-CM

## 2024-08-16 DIAGNOSIS — H52.203 MYOPIA OF BOTH EYES WITH ASTIGMATISM AND PRESBYOPIA: Primary | ICD-10-CM

## 2024-08-16 DIAGNOSIS — H25.813 COMBINED FORMS OF AGE-RELATED CATARACT OF BOTH EYES: ICD-10-CM

## 2024-08-16 PROCEDURE — 92004 COMPRE OPH EXAM NEW PT 1/>: CPT | Performed by: OPTOMETRIST

## 2024-08-16 PROCEDURE — 92134 CPTRZ OPH DX IMG PST SGM RTA: CPT | Performed by: OPTOMETRIST

## 2024-08-16 PROCEDURE — 92015 DETERMINE REFRACTIVE STATE: CPT | Performed by: OPTOMETRIST

## 2024-08-16 ASSESSMENT — REFRACTION_MANIFEST
OS_ADD: +2.00
OD_AXIS: 094
OS_CYLINDER: -2.25
OD_AXIS: 095
OS_CYLINDER: -2.25
OS_AXIS: 040
OD_CYLINDER: -1.50
OS_AXIS: 050
OD_SPHERE: -3.75
OS_SPHERE: -2.00
OD_SPHERE: -3.50
OD_CYLINDER: -1.50
OS_SPHERE: -2.00
METHOD_AUTOREFRACTION: 1
OD_ADD: +2.00

## 2024-08-16 ASSESSMENT — SLIT LAMP EXAM - LIDS
COMMENTS: NORMAL
COMMENTS: NORMAL

## 2024-08-16 ASSESSMENT — CONF VISUAL FIELD
OD_NORMAL: 1
OD_INFERIOR_TEMPORAL_RESTRICTION: 0
OS_SUPERIOR_NASAL_RESTRICTION: 0
OS_INFERIOR_TEMPORAL_RESTRICTION: 0
OD_SUPERIOR_TEMPORAL_RESTRICTION: 0
OS_NORMAL: 1
OS_SUPERIOR_TEMPORAL_RESTRICTION: 0
OD_INFERIOR_NASAL_RESTRICTION: 0
OD_SUPERIOR_NASAL_RESTRICTION: 0
OS_INFERIOR_NASAL_RESTRICTION: 0

## 2024-08-16 ASSESSMENT — ENCOUNTER SYMPTOMS
EYES NEGATIVE: 1
NEUROLOGICAL NEGATIVE: 0
CONSTITUTIONAL NEGATIVE: 0
RESPIRATORY NEGATIVE: 0
HEMATOLOGIC/LYMPHATIC NEGATIVE: 0
ENDOCRINE NEGATIVE: 0
CARDIOVASCULAR NEGATIVE: 0
GASTROINTESTINAL NEGATIVE: 0
PSYCHIATRIC NEGATIVE: 0
ALLERGIC/IMMUNOLOGIC NEGATIVE: 0
MUSCULOSKELETAL NEGATIVE: 0

## 2024-08-16 ASSESSMENT — VISUAL ACUITY
OD_CC: 20/30
OS_PH_CC+: +1
METHOD: SNELLEN - LINEAR
OS_CC: 20/40
CORRECTION_TYPE: GLASSES
OS_PH_CC: 20/30

## 2024-08-16 ASSESSMENT — CUP TO DISC RATIO
OD_RATIO: 0.1
OS_RATIO: 0.1

## 2024-08-16 ASSESSMENT — REFRACTION_WEARINGRX
OS_SPHERE: -2.50
OS_CYLINDER: -2.75
OD_CYLINDER: -1.00
OS_AXIS: 033
OD_AXIS: 090
OD_SPHERE: -4.00

## 2024-08-16 ASSESSMENT — TONOMETRY
IOP_METHOD: GOLDMANN APPLANATION
OS_IOP_MMHG: 16
OD_IOP_MMHG: 16

## 2024-08-16 ASSESSMENT — EXTERNAL EXAM - RIGHT EYE: OD_EXAM: NORMAL

## 2024-08-16 ASSESSMENT — EXTERNAL EXAM - LEFT EYE: OS_EXAM: NORMAL

## 2024-08-16 NOTE — PROGRESS NOTES
DMII with improving blood sugar. Hx of NPDR. Moderate NPDR.   Macula fluid is present OU.   VA improves to 20/25 OD and OS with refracftion.   A spectacle prescription was dispensed to be used as needed.   RTC 6 months for VA BAT DFE and Optos and macula OCT.

## 2024-08-20 ENCOUNTER — APPOINTMENT (OUTPATIENT)
Dept: ENDOCRINOLOGY | Facility: CLINIC | Age: 59
End: 2024-08-20
Payer: COMMERCIAL

## 2024-08-20 VITALS
HEIGHT: 71 IN | SYSTOLIC BLOOD PRESSURE: 156 MMHG | HEART RATE: 99 BPM | DIASTOLIC BLOOD PRESSURE: 82 MMHG | BODY MASS INDEX: 32.26 KG/M2 | WEIGHT: 230.4 LBS

## 2024-08-20 DIAGNOSIS — Z79.4 TYPE 2 DIABETES MELLITUS WITH HYPERGLYCEMIA, WITH LONG-TERM CURRENT USE OF INSULIN (MULTI): ICD-10-CM

## 2024-08-20 DIAGNOSIS — E11.65 TYPE 2 DIABETES MELLITUS WITH HYPERGLYCEMIA, WITH LONG-TERM CURRENT USE OF INSULIN (MULTI): ICD-10-CM

## 2024-08-20 DIAGNOSIS — E11.65 TYPE 2 DIABETES MELLITUS WITH HYPERGLYCEMIA, UNSPECIFIED WHETHER LONG TERM INSULIN USE (MULTI): Primary | ICD-10-CM

## 2024-08-20 LAB
POC FINGERSTICK BLOOD GLUCOSE: 286 MG/DL (ref 70–100)
POC HEMOGLOBIN A1C: 9.3 % (ref 4.2–6.5)

## 2024-08-20 PROCEDURE — 82962 GLUCOSE BLOOD TEST: CPT | Performed by: INTERNAL MEDICINE

## 2024-08-20 PROCEDURE — 99214 OFFICE O/P EST MOD 30 MIN: CPT | Performed by: INTERNAL MEDICINE

## 2024-08-20 PROCEDURE — 3079F DIAST BP 80-89 MM HG: CPT | Performed by: INTERNAL MEDICINE

## 2024-08-20 PROCEDURE — RXMED WILLOW AMBULATORY MEDICATION CHARGE

## 2024-08-20 PROCEDURE — 3048F LDL-C <100 MG/DL: CPT | Performed by: INTERNAL MEDICINE

## 2024-08-20 PROCEDURE — 3077F SYST BP >= 140 MM HG: CPT | Performed by: INTERNAL MEDICINE

## 2024-08-20 PROCEDURE — 83036 HEMOGLOBIN GLYCOSYLATED A1C: CPT | Performed by: INTERNAL MEDICINE

## 2024-08-20 PROCEDURE — 3008F BODY MASS INDEX DOCD: CPT | Performed by: INTERNAL MEDICINE

## 2024-08-20 PROCEDURE — 4010F ACE/ARB THERAPY RXD/TAKEN: CPT | Performed by: INTERNAL MEDICINE

## 2024-08-20 RX ORDER — INSULIN PMP CART,AUT,G6/7,CNTR
1 EACH SUBCUTANEOUS
Qty: 10 EACH | Refills: 11 | Status: SHIPPED | OUTPATIENT
Start: 2024-08-20 | End: 2025-08-20

## 2024-08-20 RX ORDER — INSULIN PMP CART,AUT,G6/7,CNTR
1 EACH SUBCUTANEOUS
Qty: 1 EACH | Refills: 0 | Status: SHIPPED | OUTPATIENT
Start: 2024-08-20 | End: 2025-08-20

## 2024-08-20 RX ORDER — BLOOD-GLUCOSE,RECEIVER,CONT
EACH MISCELLANEOUS
Qty: 1 EACH | Refills: 0 | Status: SHIPPED | OUTPATIENT
Start: 2024-08-20

## 2024-08-20 RX ORDER — BLOOD-GLUCOSE SENSOR
EACH MISCELLANEOUS
Qty: 3 EACH | Refills: 11 | Status: SHIPPED | OUTPATIENT
Start: 2024-08-20

## 2024-08-20 NOTE — PROGRESS NOTES
"Subjective   Jose Viera is a 58 y.o. male who presents for follow-up of Type 2 diabetes mellitus. The initial diagnosis of diabetes was made in 2002   . He was last seen in April 2023.      His right shoulder was replaced in November 2023.      He was out of Trulicity for six weeks.      He never got the Dexcom CGM.  He desires to go on an insulin pump.    Known complications due to diabetes included retinopathy    Cardiovascular risk factors include advanced age (older than 55 for men, 65 for women), diabetes mellitus, male gender, and obesity (BMI >= 30 kg/m2). The patient is on an ACE inhibitor or angiotensin II receptor blocker.  The patient has not been previously hospitalized due to diabetic ketoacidosis.     Current symptoms/problems include hyperglycemia. His clinical course has been stable.     The patient is currently checking the blood glucose.    Patient is using: glucometer  Fasting 160mg/dL  Afternoon 252-260mg/dL   700mg/dL without trulicity     Hypoglycemia frequency: 37mg/dL; twice in the last month   Hypoglycemia awareness: Yes      Current Medication Regimen  Levemir 60 units SQ twice daily  NovoLog via insulin correction scale starting at 4 units   Trulicity 4.5mg SQ once weekly     MEALS: eating smaller meals   Breakfast - oatmeal, protein pancakes   Lunch - pear, sandwich   Dinner - meats, pasta, pork, hamburgers, beeef roasts, canned soup, Chinese food   Snacks - snack peanuts - peanus, cashews, sesame sticks   Beverages - regular spirte, water      Exercise regimen  - denies   Needs knee replacement; 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   Genitourinary:         Nocturia x 1-2   All other systems reviewed and are negative.      Objective   /82 (BP Location: Left arm, Patient Position: Sitting, BP Cuff Size: Large adult)   Pulse 99   Ht 1.803 m (5' 11\")   Wt 105 kg (230 lb 6.4 " oz)   BMI 32.13 kg/m²   Physical Exam  Vitals and nursing note reviewed.   Constitutional:       General: He is not in acute distress.     Appearance: Normal appearance. He is normal weight.   HENT:      Head: Normocephalic and atraumatic.      Nose: Nose normal.      Mouth/Throat:      Mouth: Mucous membranes are moist.   Eyes:      Extraocular Movements: Extraocular movements intact.   Cardiovascular:      Rate and Rhythm: Normal rate and regular rhythm.   Pulmonary:      Effort: Pulmonary effort is normal.      Breath sounds: Normal breath sounds.   Musculoskeletal:         General: Normal range of motion.   Skin:     General: Skin is warm.   Neurological:      Mental Status: He is alert and oriented to person, place, and time.   Psychiatric:         Mood and Affect: Mood normal.       Lab Review  Glucose (mg/dL)   Date Value   06/05/2024 491 (HH)   11/22/2023 198 (H)   11/15/2023 142 (H)     POC HEMOGLOBIN A1c (%)   Date Value   08/20/2024 9.3 (A)     Hemoglobin A1C (%)   Date Value   04/27/2023 10.1 (A)   09/29/2020 9.4   03/05/2020 13.1     Bicarbonate (mmol/L)   Date Value   06/05/2024 34 (H)   11/22/2023 29   11/15/2023 31     Urea Nitrogen (mg/dL)   Date Value   06/05/2024 18   11/22/2023 34 (H)   11/15/2023 32 (H)     Creatinine (mg/dL)   Date Value   06/05/2024 0.93   11/22/2023 1.61 (H)   11/15/2023 1.09     Lab Results   Component Value Date    CHOL 158 06/05/2024    CHOL 145 04/27/2023    CHOL 167 09/29/2020     Lab Results   Component Value Date    HDL 61.9 06/05/2024    HDL 65.8 04/27/2023    HDL 75.5 09/29/2020     Lab Results   Component Value Date    LDLCALC 65 06/05/2024     Lab Results   Component Value Date    TRIG 155 (H) 06/05/2024    TRIG 81 04/27/2023    TRIG 52 09/29/2020       Health Maintenance:   Foot Exam:   Eye Exam: August 16; mild hemorrhaging   Urine Albumin:  April 27, 2023    Assessment/Plan      58-year-old male presents for follow-up for type 2 diabetes. His blood pressure is  elevated above goal today.     Type 2 diabetes mellitus with hyperglycemia (Multi)  To continue Levemir 60 units SQ twice daily  Please restart an insulin sliding scale with NovoLog as follows:  150-200mg/dL - 6 units  201-250mg/dL - 8 units   251-300 mg/dL - 10 units   301-350mg/dL - 12 units   >351mg/dL - 14 units  For Dexcom CGM   To explore insulin pump therapy with [employer]   To continue Trulicity 4.5mg SQ once weekly   Counseled that the goal A1C should be closer to 7%  Please rotate insulin injection sites  For follow up in 3 months

## 2024-08-20 NOTE — PATIENT INSTRUCTIONS
Thank you for choosing [facility]  for your health care needs.  If you have any questions, concerns or medical needs, please feel free to contact our office at [phone].    Please ensure you complete your blood work one week before the next scheduled appointment.    To continue Levemir 60 units SQ twice daily  Please restart an insulin sliding scale with NovoLog as follows:  150-200mg/dL - 6 units  201-250mg/dL - 8 units   251-300 mg/dL - 10 units   301-350mg/dL - 12 units   >351mg/dL - 14 units  For Dexcom CGM   To explore insulin pump therapy with [facility]   To continue Trulicity 4.5mg SQ once weekly   Counseled that the goal A1C should be closer to 7%  Please rotate insulin injection sites  For follow up in 3 months

## 2024-08-21 NOTE — ASSESSMENT & PLAN NOTE
To continue Levemir 60 units SQ twice daily  Please restart an insulin sliding scale with NovoLog as follows:  150-200mg/dL - 6 units  201-250mg/dL - 8 units   251-300 mg/dL - 10 units   301-350mg/dL - 12 units   >351mg/dL - 14 units  For Dexcom CGM   To explore insulin pump therapy with [employer]   To continue Trulicity 4.5mg SQ once weekly   Counseled that the goal A1C should be closer to 7%  Please rotate insulin injection sites  For follow up in 3 months

## 2024-08-23 ENCOUNTER — PHARMACY VISIT (OUTPATIENT)
Dept: PHARMACY | Facility: CLINIC | Age: 59
End: 2024-08-23
Payer: COMMERCIAL

## 2024-09-06 DIAGNOSIS — E11.65 TYPE 2 DIABETES MELLITUS WITH HYPERGLYCEMIA, UNSPECIFIED WHETHER LONG TERM INSULIN USE (MULTI): Primary | ICD-10-CM

## 2024-09-06 NOTE — TELEPHONE ENCOUNTER
Insulin pump therapy form received from [facility]; placed on Dr. Quiles' desk for completion.

## 2024-09-10 DIAGNOSIS — E11.65 TYPE 2 DIABETES MELLITUS WITH HYPERGLYCEMIA, UNSPECIFIED WHETHER LONG TERM INSULIN USE (MULTI): Primary | ICD-10-CM

## 2024-09-10 RX ORDER — INSULIN ASPART 100 [IU]/ML
INJECTION, SOLUTION INTRAVENOUS; SUBCUTANEOUS
Qty: 40 ML | Refills: 5 | Status: SHIPPED | OUTPATIENT
Start: 2024-09-10

## 2024-09-11 RX ORDER — INSULIN LISPRO 100 [IU]/ML
INJECTION, SOLUTION INTRAVENOUS; SUBCUTANEOUS
Qty: 40 ML | Refills: 5 | Status: SHIPPED | OUTPATIENT
Start: 2024-09-11

## 2024-09-11 NOTE — TELEPHONE ENCOUNTER
[facility] contacted office to inform Dr. Quiles' that novolog is not covered. Vials are needed for pump training next week.    Pharmacy was called to confirm. Tech says a PA request was faxed for insulin aspart (novolog) 9/10/24; not scanned or encounter created.     Preferred brand is insulin lispro. Pended for approval.

## 2024-09-23 ENCOUNTER — TELEPHONE (OUTPATIENT)
Dept: PRIMARY CARE | Facility: CLINIC | Age: 59
End: 2024-09-23
Payer: COMMERCIAL

## 2024-09-23 NOTE — TELEPHONE ENCOUNTER
We did not refer pt and Dr. Briones is not a  physician.  Called pt and pt informed will need to sign release before records can be sent.  Pt states he will come in to sign release

## 2024-09-29 NOTE — PROGRESS NOTES
"Subjective   Patient ID: Louis Viera \"Shakir" is a 59 y.o. male who presents for No chief complaint on file..     HPI  Are you experiencing:  Burning on urination --  Pain on urination  --  Urinary frequency --  Urinary urgency --  Urge incontinence --  Urinary stress incontinence  --   Number of pads used per day --  Eneuresis --   Nocturia--  Hematuria --  Hesitancy --  Post void fullness --       Review of Systems  General-- No C/O fever or chills  Head-- No C/O Dizziness  Eyes-- NO  C/O blurry or double vision  Ears-- No C/O hearing loss  Neck-- Supple  Chest-- No C/O pain or discomfort  Lungs-- No C/O shortness of breath  Abdomen-- No C/O  pain or discomfort, No nausea or vomiting  Back-- No C/O back pain or discomfort  Extremities-- No C/O swelling or pain    Objective   Physical Exam    General-- well developed, well nourished in NAD  Head-- normal cephalic, atraumatic  Eyes-- PERRL, EOM'S FROM,  no  jaundice  Neck-- Supple, without masses  Chest-- Normal bony structure  Abdomen-- soft, non tender, liver spleen not palpable . No supra pubic masses  Back-- no flank masses palpable, no CVA tenderness on palpation or perc;ussion  Lymph nodes-- No inguinal lymphadenopathy noted  Prostate-- +, firm, smooth, non tender,without nodules  Testis-- both down, non tender, without masses  Epididymis-- no masses palpable  Scrotum -- no hydrocele noted  Extremities -- Normal muscle mass and tone for the patients age  Neurological-- oriented times three    Assessment/Plan   A:    P:    Bruno Herrera MD 09/29/24 4:40 PM   "

## 2024-10-03 ENCOUNTER — APPOINTMENT (OUTPATIENT)
Dept: UROLOGY | Facility: CLINIC | Age: 59
End: 2024-10-03
Payer: COMMERCIAL

## 2024-10-17 ENCOUNTER — APPOINTMENT (OUTPATIENT)
Dept: UROLOGY | Facility: CLINIC | Age: 59
End: 2024-10-17
Payer: COMMERCIAL

## 2024-10-18 DIAGNOSIS — Z79.4 TYPE 2 DIABETES MELLITUS WITH HYPERGLYCEMIA, WITH LONG-TERM CURRENT USE OF INSULIN: ICD-10-CM

## 2024-10-18 DIAGNOSIS — E11.65 TYPE 2 DIABETES MELLITUS WITH HYPERGLYCEMIA, WITH LONG-TERM CURRENT USE OF INSULIN: ICD-10-CM

## 2024-10-18 RX ORDER — INSULIN PMP CART,AUT,G6/7,CNTR
1 EACH SUBCUTANEOUS
Qty: 10 EACH | Refills: 11 | Status: SHIPPED | OUTPATIENT
Start: 2024-10-18 | End: 2025-10-18

## 2024-10-19 ENCOUNTER — PHARMACY VISIT (OUTPATIENT)
Dept: PHARMACY | Facility: CLINIC | Age: 59
End: 2024-10-19
Payer: COMMERCIAL

## 2024-10-19 PROCEDURE — RXMED WILLOW AMBULATORY MEDICATION CHARGE

## 2024-11-02 ENCOUNTER — APPOINTMENT (OUTPATIENT)
Dept: RADIOLOGY | Facility: HOSPITAL | Age: 59
End: 2024-11-02
Payer: COMMERCIAL

## 2024-11-02 ENCOUNTER — HOSPITAL ENCOUNTER (EMERGENCY)
Facility: HOSPITAL | Age: 59
Discharge: HOME | End: 2024-11-02
Attending: STUDENT IN AN ORGANIZED HEALTH CARE EDUCATION/TRAINING PROGRAM
Payer: COMMERCIAL

## 2024-11-02 VITALS
BODY MASS INDEX: 31.5 KG/M2 | HEART RATE: 95 BPM | SYSTOLIC BLOOD PRESSURE: 114 MMHG | OXYGEN SATURATION: 96 % | WEIGHT: 225 LBS | DIASTOLIC BLOOD PRESSURE: 66 MMHG | TEMPERATURE: 98.4 F | RESPIRATION RATE: 20 BRPM | HEIGHT: 71 IN

## 2024-11-02 DIAGNOSIS — S46.911A STRAIN OF RIGHT SHOULDER, INITIAL ENCOUNTER: Primary | ICD-10-CM

## 2024-11-02 PROCEDURE — 71045 X-RAY EXAM CHEST 1 VIEW: CPT | Performed by: RADIOLOGY

## 2024-11-02 PROCEDURE — 71045 X-RAY EXAM CHEST 1 VIEW: CPT

## 2024-11-02 PROCEDURE — 2500000005 HC RX 250 GENERAL PHARMACY W/O HCPCS: Performed by: STUDENT IN AN ORGANIZED HEALTH CARE EDUCATION/TRAINING PROGRAM

## 2024-11-02 PROCEDURE — 73030 X-RAY EXAM OF SHOULDER: CPT | Mod: RT

## 2024-11-02 PROCEDURE — 99284 EMERGENCY DEPT VISIT MOD MDM: CPT | Mod: 25

## 2024-11-02 PROCEDURE — 73030 X-RAY EXAM OF SHOULDER: CPT | Mod: RIGHT SIDE | Performed by: RADIOLOGY

## 2024-11-02 RX ORDER — LIDOCAINE 560 MG/1
1 PATCH PERCUTANEOUS; TOPICAL; TRANSDERMAL ONCE
Status: DISCONTINUED | OUTPATIENT
Start: 2024-11-02 | End: 2024-11-02 | Stop reason: HOSPADM

## 2024-11-02 RX ADMIN — LIDOCAINE 4% 1 PATCH: 40 PATCH TOPICAL at 02:03

## 2024-11-02 ASSESSMENT — PAIN SCALES - GENERAL
PAINLEVEL_OUTOF10: 9
PAINLEVEL_OUTOF10: 9

## 2024-11-02 ASSESSMENT — LIFESTYLE VARIABLES
HAVE YOU EVER FELT YOU SHOULD CUT DOWN ON YOUR DRINKING: NO
TOTAL SCORE: 0
HAVE PEOPLE ANNOYED YOU BY CRITICIZING YOUR DRINKING: NO
EVER FELT BAD OR GUILTY ABOUT YOUR DRINKING: NO
EVER HAD A DRINK FIRST THING IN THE MORNING TO STEADY YOUR NERVES TO GET RID OF A HANGOVER: NO

## 2024-11-02 ASSESSMENT — PAIN DESCRIPTION - PAIN TYPE: TYPE: ACUTE PAIN

## 2024-11-02 ASSESSMENT — PAIN - FUNCTIONAL ASSESSMENT: PAIN_FUNCTIONAL_ASSESSMENT: 0-10

## 2024-11-02 ASSESSMENT — PAIN DESCRIPTION - LOCATION: LOCATION: SHOULDER

## 2024-11-02 ASSESSMENT — PAIN DESCRIPTION - ORIENTATION: ORIENTATION: RIGHT

## 2024-11-18 ENCOUNTER — APPOINTMENT (OUTPATIENT)
Dept: PRIMARY CARE | Facility: CLINIC | Age: 59
End: 2024-11-18
Payer: COMMERCIAL

## 2024-11-18 VITALS
TEMPERATURE: 97 F | WEIGHT: 265 LBS | BODY MASS INDEX: 36.96 KG/M2 | HEART RATE: 80 BPM | OXYGEN SATURATION: 97 % | SYSTOLIC BLOOD PRESSURE: 144 MMHG | DIASTOLIC BLOOD PRESSURE: 82 MMHG

## 2024-11-18 DIAGNOSIS — R53.83 FATIGUE, UNSPECIFIED TYPE: ICD-10-CM

## 2024-11-18 DIAGNOSIS — M25.511 ACUTE PAIN OF RIGHT SHOULDER: ICD-10-CM

## 2024-11-18 DIAGNOSIS — Z12.5 SCREENING FOR PROSTATE CANCER: ICD-10-CM

## 2024-11-18 DIAGNOSIS — Z96.611 HISTORY OF RIGHT SHOULDER REPLACEMENT: ICD-10-CM
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
## 2024-11-18 DIAGNOSIS — J98.6 ELEVATED HEMIDIAPHRAGM: Primary | ICD-10-CM

## 2024-11-18 PROCEDURE — 1036F TOBACCO NON-USER: CPT | Performed by: FAMILY MEDICINE

## 2024-11-18 PROCEDURE — 3048F LDL-C <100 MG/DL: CPT | Performed by: FAMILY MEDICINE

## 2024-11-18 PROCEDURE — 3079F DIAST BP 80-89 MM HG: CPT | Performed by: FAMILY MEDICINE

## 2024-11-18 PROCEDURE — 4010F ACE/ARB THERAPY RXD/TAKEN: CPT | Performed by: FAMILY MEDICINE

## 2024-11-18 PROCEDURE — 99214 OFFICE O/P EST MOD 30 MIN: CPT | Performed by: FAMILY MEDICINE

## 2024-11-18 PROCEDURE — 3077F SYST BP >= 140 MM HG: CPT | Performed by: FAMILY MEDICINE

## 2024-11-18 RX ORDER — CELECOXIB 200 MG/1
1 CAPSULE ORAL
COMMUNITY
Start: 2024-10-27

## 2024-11-18 RX ORDER — DULOXETIN HYDROCHLORIDE 60 MG/1
CAPSULE, DELAYED RELEASE ORAL
COMMUNITY
Start: 2024-09-05

## 2024-11-18 ASSESSMENT — ENCOUNTER SYMPTOMS
VOMITING: 0
DIARRHEA: 0
SHORTNESS OF BREATH: 0
ABDOMINAL PAIN: 0
CHILLS: 0
FEVER: 0
COUGH: 0
NAUSEA: 0

## 2024-11-18 NOTE — LETTER
November 18, 2024     Patient: Louis Viera   YOB: 1965   Date of Visit: 11/18/2024       To Whom It May Concern:    Louis Viera was seen in my clinic on 11/18/2024 at 2:10 pm. He has had a recurrent staph infection in his nares and is being treated appropriately with mupirocin. I would recommend that he wear a mask to minimize risk of spreading infection.            Sincerely,         Marlys Dickson, DO
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
## 2024-11-20 ENCOUNTER — APPOINTMENT (OUTPATIENT)
Dept: ORTHOPEDIC SURGERY | Facility: CLINIC | Age: 59
End: 2024-11-20
Payer: COMMERCIAL

## 2024-11-22 ENCOUNTER — HOSPITAL ENCOUNTER (OUTPATIENT)
Dept: RADIOLOGY | Facility: HOSPITAL | Age: 59
Discharge: HOME | End: 2024-11-22
Payer: COMMERCIAL

## 2024-11-22 DIAGNOSIS — J98.6 ELEVATED HEMIDIAPHRAGM: ICD-10-CM

## 2024-11-22 PROCEDURE — 71048 X-RAY EXAM CHEST 4+ VIEWS: CPT
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
## 2024-11-25 ENCOUNTER — APPOINTMENT (OUTPATIENT)
Dept: PRIMARY CARE | Facility: CLINIC | Age: 59
End: 2024-11-25
Payer: COMMERCIAL

## 2024-11-25 VITALS
BODY MASS INDEX: 37.8 KG/M2 | WEIGHT: 271 LBS | TEMPERATURE: 97.1 F | DIASTOLIC BLOOD PRESSURE: 80 MMHG | SYSTOLIC BLOOD PRESSURE: 122 MMHG | OXYGEN SATURATION: 98 % | HEART RATE: 84 BPM

## 2024-11-25 DIAGNOSIS — E78.5 HYPERLIPIDEMIA, UNSPECIFIED HYPERLIPIDEMIA TYPE: ICD-10-CM

## 2024-11-25 DIAGNOSIS — E11.65 TYPE 2 DIABETES MELLITUS WITH HYPERGLYCEMIA, UNSPECIFIED WHETHER LONG TERM INSULIN USE (MULTI): ICD-10-CM

## 2024-11-25 DIAGNOSIS — J98.6 ELEVATED HEMIDIAPHRAGM: Primary | ICD-10-CM

## 2024-11-25 DIAGNOSIS — R63.5 WEIGHT GAIN: ICD-10-CM

## 2024-11-25 DIAGNOSIS — Z23 FLU VACCINE NEED: ICD-10-CM

## 2024-11-25 PROBLEM — I10 BENIGN ESSENTIAL HYPERTENSION: Status: RESOLVED | Noted: 2021-09-21 | Resolved: 2024-11-25

## 2024-11-25 PROCEDURE — 90656 IIV3 VACC NO PRSV 0.5 ML IM: CPT | Performed by: FAMILY MEDICINE

## 2024-11-25 PROCEDURE — 90471 IMMUNIZATION ADMIN: CPT | Performed by: FAMILY MEDICINE

## 2024-11-25 PROCEDURE — 3074F SYST BP LT 130 MM HG: CPT | Performed by: FAMILY MEDICINE

## 2024-11-25 PROCEDURE — 99214 OFFICE O/P EST MOD 30 MIN: CPT | Performed by: FAMILY MEDICINE

## 2024-11-25 PROCEDURE — 1036F TOBACCO NON-USER: CPT | Performed by: FAMILY MEDICINE

## 2024-11-25 PROCEDURE — 3079F DIAST BP 80-89 MM HG: CPT | Performed by: FAMILY MEDICINE

## 2024-11-25 PROCEDURE — 3048F LDL-C <100 MG/DL: CPT | Performed by: FAMILY MEDICINE

## 2024-11-25 RX ORDER — LIDOCAINE 4 G/100G
1 PATCH TOPICAL DAILY
COMMUNITY
Start: 2024-11-21

## 2024-11-25 RX ORDER — AMOXICILLIN 250 MG
2 CAPSULE ORAL 2 TIMES DAILY
Qty: 120 TABLET | Refills: 5 | Status: CANCELLED | OUTPATIENT
Start: 2024-11-25 | End: 2025-11-25

## 2024-11-25 RX ORDER — LISINOPRIL 20 MG/1
20 TABLET ORAL DAILY
Qty: 90 TABLET | Refills: 1 | Status: CANCELLED | OUTPATIENT
Start: 2024-11-25 | End: 2025-12-30

## 2024-11-25 ASSESSMENT — ENCOUNTER SYMPTOMS
DIARRHEA: 0
FEVER: 0
CHILLS: 0
NAUSEA: 0
COUGH: 0
VOMITING: 0
ABDOMINAL PAIN: 0
WHEEZING: 0
DYSURIA: 0
SHORTNESS OF BREATH: 0

## 2024-11-25 NOTE — ASSESSMENT & PLAN NOTE
Patient not experiencing any respiratory distress.  Check sniff test, chest CT, and PFTs.  Refer to pulmonology for follow-up.
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
## 2024-11-26 ENCOUNTER — APPOINTMENT (OUTPATIENT)
Dept: ENDOCRINOLOGY | Facility: CLINIC | Age: 59
End: 2024-11-26
Payer: COMMERCIAL

## 2024-12-03 ENCOUNTER — APPOINTMENT (OUTPATIENT)
Dept: RESPIRATORY THERAPY | Facility: HOSPITAL | Age: 59
End: 2024-12-03
Payer: COMMERCIAL

## 2024-12-08 DIAGNOSIS — Z79.4 TYPE 2 DIABETES MELLITUS WITH HYPERGLYCEMIA, WITH LONG-TERM CURRENT USE OF INSULIN: ICD-10-CM

## 2024-12-08 DIAGNOSIS — E11.65 TYPE 2 DIABETES MELLITUS WITH HYPERGLYCEMIA, UNSPECIFIED WHETHER LONG TERM INSULIN USE (MULTI): ICD-10-CM

## 2024-12-08 DIAGNOSIS — E11.65 TYPE 2 DIABETES MELLITUS WITH HYPERGLYCEMIA, WITH LONG-TERM CURRENT USE OF INSULIN: ICD-10-CM

## 2024-12-08 RX ORDER — DULAGLUTIDE 4.5 MG/.5ML
INJECTION, SOLUTION SUBCUTANEOUS
Qty: 2 ML | Refills: 0 | Status: CANCELLED | OUTPATIENT
Start: 2024-12-08 | End: 2025-12-08

## 2024-12-08 RX ORDER — INSULIN DETEMIR 100 [IU]/ML
60 INJECTION, SOLUTION SUBCUTANEOUS 2 TIMES DAILY
Qty: 36 ML | Refills: 5 | Status: CANCELLED | OUTPATIENT
Start: 2024-12-08 | End: 2025-06-06

## 2024-12-09 DIAGNOSIS — Z79.4 TYPE 2 DIABETES MELLITUS WITH HYPERGLYCEMIA, WITH LONG-TERM CURRENT USE OF INSULIN: ICD-10-CM

## 2024-12-09 DIAGNOSIS — E11.65 TYPE 2 DIABETES MELLITUS WITH HYPERGLYCEMIA, WITH LONG-TERM CURRENT USE OF INSULIN: ICD-10-CM

## 2024-12-09 DIAGNOSIS — E11.65 TYPE 2 DIABETES MELLITUS WITH HYPERGLYCEMIA, UNSPECIFIED WHETHER LONG TERM INSULIN USE (MULTI): ICD-10-CM

## 2024-12-09 RX ORDER — INSULIN DETEMIR 100 [IU]/ML
60 INJECTION, SOLUTION SUBCUTANEOUS 2 TIMES DAILY
Qty: 45 ML | Refills: 5 | Status: SHIPPED | OUTPATIENT
Start: 2024-12-09 | End: 2025-06-07

## 2024-12-10 RX ORDER — INSULIN DETEMIR 100 [IU]/ML
60 INJECTION, SOLUTION SUBCUTANEOUS 2 TIMES DAILY
Qty: 45 ML | Refills: 5 | OUTPATIENT
Start: 2024-12-10 | End: 2025-06-08

## 2024-12-10 RX ORDER — DULAGLUTIDE 4.5 MG/.5ML
INJECTION, SOLUTION SUBCUTANEOUS
Qty: 2 ML | Refills: 0 | OUTPATIENT
Start: 2024-12-10 | End: 2025-12-10

## 2024-12-12 ENCOUNTER — HOSPITAL ENCOUNTER (OUTPATIENT)
Dept: RADIOLOGY | Facility: HOSPITAL | Age: 59
Discharge: HOME | End: 2024-12-12
Payer: COMMERCIAL

## 2024-12-12 DIAGNOSIS — E11.65 TYPE 2 DIABETES MELLITUS WITH HYPERGLYCEMIA, WITH LONG-TERM CURRENT USE OF INSULIN: ICD-10-CM

## 2024-12-12 DIAGNOSIS — Z79.4 TYPE 2 DIABETES MELLITUS WITH HYPERGLYCEMIA, WITH LONG-TERM CURRENT USE OF INSULIN: ICD-10-CM

## 2024-12-12 DIAGNOSIS — J98.6 ELEVATED HEMIDIAPHRAGM: ICD-10-CM

## 2024-12-12 PROCEDURE — 71250 CT THORAX DX C-: CPT

## 2024-12-12 RX ORDER — BLOOD-GLUCOSE SENSOR
EACH MISCELLANEOUS
Qty: 3 EACH | Refills: 11 | Status: SHIPPED | OUTPATIENT
Start: 2024-12-12

## 2024-12-16 ENCOUNTER — HOSPITAL ENCOUNTER (OUTPATIENT)
Dept: RADIOLOGY | Facility: HOSPITAL | Age: 59
End: 2024-12-16
Payer: COMMERCIAL

## 2024-12-18 DIAGNOSIS — E11.65 TYPE 2 DIABETES MELLITUS WITH HYPERGLYCEMIA, WITH LONG-TERM CURRENT USE OF INSULIN: ICD-10-CM

## 2024-12-18 DIAGNOSIS — Z79.4 TYPE 2 DIABETES MELLITUS WITH HYPERGLYCEMIA, WITH LONG-TERM CURRENT USE OF INSULIN: ICD-10-CM

## 2024-12-19 DIAGNOSIS — E11.65 TYPE 2 DIABETES MELLITUS WITH HYPERGLYCEMIA, UNSPECIFIED WHETHER LONG TERM INSULIN USE (MULTI): Primary | ICD-10-CM

## 2024-12-19 DIAGNOSIS — E11.65 TYPE 2 DIABETES MELLITUS WITH HYPERGLYCEMIA, UNSPECIFIED WHETHER LONG TERM INSULIN USE (MULTI): ICD-10-CM

## 2024-12-19 PROCEDURE — RXMED WILLOW AMBULATORY MEDICATION CHARGE

## 2024-12-19 RX ORDER — CELECOXIB 200 MG/1
200 CAPSULE ORAL
Status: CANCELLED | OUTPATIENT
Start: 2024-12-19

## 2024-12-19 RX ORDER — DULAGLUTIDE 4.5 MG/.5ML
INJECTION, SOLUTION SUBCUTANEOUS
Qty: 2 ML | Refills: 0 | Status: CANCELLED | OUTPATIENT
Start: 2024-12-19 | End: 2025-12-19

## 2024-12-19 RX ORDER — INSULIN GLARGINE 100 [IU]/ML
60 INJECTION, SOLUTION SUBCUTANEOUS 2 TIMES DAILY
Qty: 45 ML | Refills: 5 | Status: SHIPPED | OUTPATIENT
Start: 2024-12-19 | End: 2025-06-17

## 2024-12-19 RX ORDER — DULAGLUTIDE 4.5 MG/.5ML
4.5 INJECTION, SOLUTION SUBCUTANEOUS WEEKLY
Qty: 2 ML | Refills: 5 | Status: SHIPPED | OUTPATIENT
Start: 2024-12-19 | End: 2025-06-17

## 2024-12-19 RX ORDER — INSULIN DETEMIR 100 [IU]/ML
60 INJECTION, SOLUTION SUBCUTANEOUS 2 TIMES DAILY
Qty: 45 ML | Refills: 5 | Status: SHIPPED | OUTPATIENT
Start: 2024-12-19 | End: 2025-06-17

## 2024-12-19 RX ORDER — DULAGLUTIDE 4.5 MG/.5ML
INJECTION, SOLUTION SUBCUTANEOUS
Qty: 2 ML | Refills: 5 | Status: SHIPPED | OUTPATIENT
Start: 2024-12-19 | End: 2025-12-19

## 2024-12-19 NOTE — TELEPHONE ENCOUNTER
Next appt 5/28/2025    [facility] [place] state the levemir was discontinued please resend (patient will call [employer] to schedule pump training)

## 2024-12-22 ENCOUNTER — PHARMACY VISIT (OUTPATIENT)
Dept: PHARMACY | Facility: CLINIC | Age: 59
End: 2024-12-22
Payer: COMMERCIAL

## 2024-12-24 ENCOUNTER — TRANSCRIBE ORDERS (OUTPATIENT)
Dept: ORTHOPEDIC SURGERY | Facility: HOSPITAL | Age: 59
End: 2024-12-24
Payer: COMMERCIAL

## 2024-12-24 DIAGNOSIS — M54.12 CERVICAL RADICULOPATHY: ICD-10-CM

## 2024-12-31 ENCOUNTER — APPOINTMENT (OUTPATIENT)
Dept: RADIOLOGY | Facility: CLINIC | Age: 59
End: 2024-12-31
Payer: COMMERCIAL

## 2024-12-31 ENCOUNTER — APPOINTMENT (OUTPATIENT)
Dept: ORTHOPEDIC SURGERY | Facility: CLINIC | Age: 59
End: 2024-12-31
Payer: COMMERCIAL

## 2025-01-22 DIAGNOSIS — E11.65 TYPE 2 DIABETES MELLITUS WITH HYPERGLYCEMIA, UNSPECIFIED WHETHER LONG TERM INSULIN USE (MULTI): Primary | ICD-10-CM

## 2025-01-27 ENCOUNTER — APPOINTMENT (OUTPATIENT)
Dept: PULMONOLOGY | Facility: CLINIC | Age: 60
End: 2025-01-27
Payer: COMMERCIAL

## 2025-02-04 ENCOUNTER — APPOINTMENT (OUTPATIENT)
Dept: PHARMACY | Facility: HOSPITAL | Age: 60
End: 2025-02-04
Payer: COMMERCIAL

## 2025-02-04 DIAGNOSIS — E11.65 TYPE 2 DIABETES MELLITUS WITH HYPERGLYCEMIA, UNSPECIFIED WHETHER LONG TERM INSULIN USE (MULTI): ICD-10-CM

## 2025-02-04 NOTE — PROGRESS NOTES
"Louis Viera \"Bill\" is a 59 y.o. male was referred to Clinical Pharmacy Team to complete a comprehensive medication review (CMR) with a pharmacist as part of the [employer] diabetes program.      Referring Provider: Marlys Dickson DO  Endocrinologist: Kb    Subjective   Allergies   Allergen Reactions    Armodafinil Palpitations     tachycardia    Fentanyl Palpitations and Other     sweating, tachycardia        [id number] - [place], [place] - [address]  [address]  [address]  [facility] [address]  Phone: [phone] Fax: [phone]    [facility] - KENIA Camilo - [facility] AT Portal to [facility] Sites  [facility]  [place] [address]  Phone: [phone] Fax: [phone]     [facility]  [address] [facility] [address]  Phone: [phone] Fax: [phone]      Diabetes  He presents for his follow-up diabetic visit. He has type 2 diabetes mellitus. There are no hypoglycemic associated symptoms. There are no hypoglycemic complications. Risk factors for coronary artery disease include male sex, sedentary lifestyle, diabetes mellitus and obesity. Current diabetic treatment includes insulin injections.       Objective     There were no vitals taken for this visit.     LAB  Lab Results   Component Value Date    BILITOT 0.5 06/05/2024    CALCIUM 9.6 06/05/2024    CO2 34 (H) 06/05/2024    CL 93 (L) 06/05/2024    CREATININE 0.93 06/05/2024    GLUCOSE 491 (HH) 06/05/2024    ALKPHOS 109 06/05/2024    K 4.8 06/05/2024    PROT 6.7 06/05/2024     (L) 06/05/2024    AST 9 06/05/2024    ALT 12 06/05/2024    BUN 18 06/05/2024    ANIONGAP 10 06/05/2024    ALBUMIN 4.1 06/05/2024    LIPASE 26 09/30/2020    GFRMALE >90 04/27/2023     Lab Results   Component Value Date    TRIG 155 (H) 06/05/2024    CHOL 158 06/05/2024    LDLCALC 65 06/05/2024    HDL 61.9 " 06/05/2024     Lab Results   Component Value Date    HGBA1C 9.3 (A) 08/20/2024       Current Outpatient Medications on File Prior to Visit   Medication Sig Dispense Refill    blood sugar diagnostic strip       blood-glucose meter,continuous (Dexcom G7 ) misc Use as instructed 1 each 0    blood-glucose sensor (Dexcom G7 Sensor) device For continuous glucose monitoring.  Change every 10 days. 3 each 11    buPROPion XL (Wellbutrin XL) 300 mg 24 hr tablet Take 1 tablet (300 mg) by mouth once daily in the morning.      celecoxib (CeleBREX) 200 mg capsule Take 1 capsule (200 mg) by mouth early in the morning..      dulaglutide (Trulicity) 4.5 mg/0.5 mL pen injector INJECT 4.5MG SUBCUTANEOUSLY EVERY WEEK 2 mL 5    dulaglutide (Trulicity) 4.5 mg/0.5 mL pen injector Inject 4.5 mg under the skin 1 (one) time per week. 2 mL 5    DULoxetine (Cymbalta) 60 mg DR capsule TAKE 1 CAPSULE BY MOUTH DAILY. TO FOLLOW 30 MG CAPSULE      escitalopram (Lexapro) 20 mg tablet Take 1 tablet (20 mg) by mouth once daily.      gabapentin (Neurontin) 300 mg capsule Take 1 capsule (300 mg) by mouth 2 times a day.      insulin aspart (NovoLOG) 100 unit/mL injection To be used with insulin pump.  Max of 100 units per day 40 mL 5    insulin detemir (Levemir FlexPen) 100 unit/mL (3 mL) pen Inject 60 Units under the skin 2 times a day. 45 mL 5    insulin glargine (Lantus) 100 unit/mL (3 mL) pen Inject 60 Units under the skin 2 times a day. Take as directed per insulin instructions. 45 mL 5    insulin lispro (HumaLOG) 100 unit/mL injection To be used with insulin pump. Max of 100 units per day. 40 mL 5    insulin pump cart,auto,BT,G6/7 (Omnipod 5 G6-G7 Pods, Gen 5,) cartridge Inject 1 each under the skin every 3rd day. 10 each 11    insulin pump cart,auto,BT-cntr (Omnipod 5 G6 Intro Kit, Gen 5,) cartridge Inject 1 each under the skin every 3rd day. 1 each 0    lamoTRIgine (LaMICtal) 150 mg tablet Take 1 tablet (150 mg) by mouth once daily.       lancets (Accu-Chek Fastclix Lancet Drum) misc Testing twice daily      lidocaine HCL 4 % adhesive patch,medicated Apply 1 each topically once daily as needed (Pain). 14 patch 0    Lidocaine Pain Relief 4 % patch Place 1 patch on the skin once daily.      methocarbamol (Robaxin) 750 mg tablet Take 1 tablet (750 mg) by mouth once daily.      MULTIVITAMIN ORAL Take 1 tablet by mouth once daily.      mupirocin (Bactroban) 2 % ointment Apply topically 3 times a day. apply to affected area 22 g 0    oxyCODONE-acetaminophen (Percocet) 7.5-325 mg tablet Take 1 tablet by mouth 3 times a day.      QUEtiapine (SEROquel) 50 mg tablet Take 3 tablets (150 mg) by mouth once daily at bedtime. AS DIRECTED FOR SLEEP       No current facility-administered medications on file prior to visit.        HISTORICAL PHARMACOTHERAPY  -N/A    DRUG INTERACTIONS  - N/A    Assessment/Plan   Problem List Items Addressed This Visit       Type 2 diabetes mellitus with hyperglycemia (Multi)       Employee Health Diabetes Program (VBID)  - Patient enrolled in  Employee diabetes program for $0 co-pays on diabetes medications/supplies. Enrollment should be active in 2-4 weeks  - Requested VBID enrollment date: 1/28/25   - PharmD Management Level: 3  -  Pharmacy Fill Location: [place]    Follow up: 11 months    Continue all meds under the continuation of care with the referring provider and clinical pharmacy team.    Adama Kelly, PharmD     Verbal consent to manage patient's drug therapy was obtained from the patient . They were informed they may decline to participate or withdraw from participation in pharmacy services at any time.

## 2025-02-17 ENCOUNTER — APPOINTMENT (OUTPATIENT)
Dept: PULMONOLOGY | Facility: CLINIC | Age: 60
End: 2025-02-17
Payer: COMMERCIAL

## 2025-02-20 ENCOUNTER — APPOINTMENT (OUTPATIENT)
Dept: OPHTHALMOLOGY | Facility: CLINIC | Age: 60
End: 2025-02-20
Payer: COMMERCIAL

## 2025-02-21 PROCEDURE — RXMED WILLOW AMBULATORY MEDICATION CHARGE

## 2025-02-22 ENCOUNTER — PHARMACY VISIT (OUTPATIENT)
Dept: PHARMACY | Facility: CLINIC | Age: 60
End: 2025-02-22
Payer: COMMERCIAL

## 2025-02-27 ENCOUNTER — APPOINTMENT (OUTPATIENT)
Dept: OPHTHALMOLOGY | Age: 60
End: 2025-02-27
Payer: COMMERCIAL

## 2025-02-27 DIAGNOSIS — H52.13 MYOPIA OF BOTH EYES WITH ASTIGMATISM AND PRESBYOPIA: ICD-10-CM

## 2025-02-27 DIAGNOSIS — E11.3393 MODERATE NONPROLIFERATIVE DIABETIC RETINOPATHY OF BOTH EYES WITHOUT MACULAR EDEMA ASSOCIATED WITH TYPE 2 DIABETES MELLITUS: ICD-10-CM

## 2025-02-27 DIAGNOSIS — E11.3313 MODERATE NONPROLIFERATIVE DIABETIC RETINOPATHY OF BOTH EYES WITH MACULAR EDEMA ASSOCIATED WITH TYPE 2 DIABETES MELLITUS: Primary | ICD-10-CM

## 2025-02-27 DIAGNOSIS — H52.203 MYOPIA OF BOTH EYES WITH ASTIGMATISM AND PRESBYOPIA: ICD-10-CM

## 2025-02-27 DIAGNOSIS — H52.4 MYOPIA OF BOTH EYES WITH ASTIGMATISM AND PRESBYOPIA: ICD-10-CM

## 2025-02-27 PROCEDURE — 92015 DETERMINE REFRACTIVE STATE: CPT | Performed by: OPTOMETRIST

## 2025-02-27 PROCEDURE — 92014 COMPRE OPH EXAM EST PT 1/>: CPT | Performed by: OPTOMETRIST

## 2025-02-27 PROCEDURE — 92250 FUNDUS PHOTOGRAPHY W/I&R: CPT | Performed by: OPTOMETRIST

## 2025-02-27 ASSESSMENT — REFRACTION_MANIFEST
OS_AXIS: 040
OD_CYLINDER: -1.50
OD_SPHERE: -3.25
OD_AXIS: 094
OD_AXIS: 095
OS_SPHERE: -2.00
OS_CYLINDER: -2.25
OS_ADD: +2.25
OS_AXIS: 040
OS_SPHERE: -2.00
OS_CYLINDER: -2.25
METHOD_AUTOREFRACTION: 1
OD_CYLINDER: -1.50
OD_SPHERE: -3.50
OD_ADD: +2.25

## 2025-02-27 ASSESSMENT — VISUAL ACUITY
OD_CC: 20/40
OS_BAT_MED: 20/50
OS_CC+: -1
OS_CC: 20/40
OD_BAT_MED: 20/80
CORRECTION_TYPE: GLASSES
METHOD: SNELLEN - LINEAR
OD_CC+: +2

## 2025-02-27 ASSESSMENT — ENCOUNTER SYMPTOMS
GASTROINTESTINAL NEGATIVE: 0
CONSTITUTIONAL NEGATIVE: 0
EYES NEGATIVE: 1
HEMATOLOGIC/LYMPHATIC NEGATIVE: 0
RESPIRATORY NEGATIVE: 0
CARDIOVASCULAR NEGATIVE: 0
ENDOCRINE NEGATIVE: 0
NEUROLOGICAL NEGATIVE: 0
PSYCHIATRIC NEGATIVE: 0
ALLERGIC/IMMUNOLOGIC NEGATIVE: 0
MUSCULOSKELETAL NEGATIVE: 0

## 2025-02-27 ASSESSMENT — SLIT LAMP EXAM - LIDS
COMMENTS: NORMAL
COMMENTS: NORMAL

## 2025-02-27 ASSESSMENT — EXTERNAL EXAM - RIGHT EYE: OD_EXAM: NORMAL

## 2025-02-27 ASSESSMENT — CUP TO DISC RATIO
OS_RATIO: 0.1
OD_RATIO: 0.1

## 2025-02-27 ASSESSMENT — EXTERNAL EXAM - LEFT EYE: OS_EXAM: NORMAL

## 2025-02-27 ASSESSMENT — TONOMETRY
OS_IOP_MMHG: 15
OD_IOP_MMHG: 16
IOP_METHOD: GOLDMANN APPLANATION

## 2025-02-27 ASSESSMENT — REFRACTION_WEARINGRX
OD_CYLINDER: -1.00
OD_AXIS: 090
OD_SPHERE: -4.00
OS_CYLINDER: -2.75
OS_SPHERE: -2.50
OS_AXIS: 033

## 2025-02-27 NOTE — PROGRESS NOTES
DMII with improving blood sugar last A1C 9.3 and recently episodic hypoglycemia. Hx Moderate NPDR.   Macula fluid is present OU.     Optical coherence tomography of the macula revealed:   OD: Irregular foveal contour due to intraretinal fluid, photoreceptor, retinal pigment epithelium, IS/OS junction, central field 325 micron.  Vitreous hyaloid base visualized.  Findings are c/w DME  OS:  Irregular foveal contour due to intraretinal fluid, photoreceptor, retinal pigment epithelium, IS/OS junction, central field 332 micron.  Vitreous hyaloid base visualized.  Findings are c/w DME    VA improves to 20/30 20/25 OD/OS with refraction was 20/25 OD/OS.    OPTOS reveals stable DBH and moderate NPDR.     VA is worse slightly and definite central fluid. Referred to retina service for ongoing management.

## 2025-03-03 ENCOUNTER — APPOINTMENT (OUTPATIENT)
Dept: OPHTHALMOLOGY | Facility: CLINIC | Age: 60
End: 2025-03-03
Payer: COMMERCIAL

## 2025-03-03 DIAGNOSIS — E11.3313 MODERATE NONPROLIFERATIVE DIABETIC RETINOPATHY OF BOTH EYES WITH MACULAR EDEMA ASSOCIATED WITH TYPE 2 DIABETES MELLITUS: Primary | ICD-10-CM

## 2025-03-03 PROCEDURE — 99214 OFFICE O/P EST MOD 30 MIN: CPT

## 2025-03-03 PROCEDURE — 92134 CPTRZ OPH DX IMG PST SGM RTA: CPT

## 2025-03-03 ASSESSMENT — VISUAL ACUITY
OS_CC: 20/40
OS_PH_CC+: -2
METHOD: SNELLEN - LINEAR
OS_PH_CC: 20/30
OD_CC: 20/50
CORRECTION_TYPE: GLASSES

## 2025-03-03 ASSESSMENT — ENCOUNTER SYMPTOMS
GASTROINTESTINAL NEGATIVE: 0
MUSCULOSKELETAL NEGATIVE: 0
EYES NEGATIVE: 0
CARDIOVASCULAR NEGATIVE: 0
HEMATOLOGIC/LYMPHATIC NEGATIVE: 0
ENDOCRINE NEGATIVE: 0
RESPIRATORY NEGATIVE: 0
CONSTITUTIONAL NEGATIVE: 0
ALLERGIC/IMMUNOLOGIC NEGATIVE: 0
NEUROLOGICAL NEGATIVE: 0
PSYCHIATRIC NEGATIVE: 0

## 2025-03-03 ASSESSMENT — SLIT LAMP EXAM - LIDS
COMMENTS: NORMAL
COMMENTS: NORMAL

## 2025-03-03 ASSESSMENT — EXTERNAL EXAM - LEFT EYE: OS_EXAM: NORMAL

## 2025-03-03 ASSESSMENT — TONOMETRY
OS_IOP_MMHG: 14
IOP_METHOD: GOLDMANN APPLANATION
OD_IOP_MMHG: 14

## 2025-03-03 ASSESSMENT — CUP TO DISC RATIO
OD_RATIO: 0.1
OS_RATIO: 0.1

## 2025-03-03 ASSESSMENT — EXTERNAL EXAM - RIGHT EYE: OD_EXAM: NORMAL

## 2025-03-03 NOTE — PROGRESS NOTES
Moderate - Severe nonproliferative diabetic retinopathy with macular edema in type II DM, both eyesE11.2218  - Hx of Diabetes 25 years  - Most recent HbA1c = 9.3  - Hx of HTN  - No Hx of kidney disease    - OCT 03/03/25   --- OD: Increased retinal thickness, center involving DME.   --- OS: Increased retinal thickness, center involving DME.    #Plan#:   - Emphasized the importance of blood glucose, BP, and cholestrol level control  - Discussed risks/benefits/alteranatives of treatment options. Patient elects to proceed with injections OD  - Patient has  insurance  - Will get PA for DEISY or DAKOTAH   - RTC in 4 weeks

## 2025-04-10 ENCOUNTER — APPOINTMENT (OUTPATIENT)
Dept: OPHTHALMOLOGY | Facility: CLINIC | Age: 60
End: 2025-04-10
Payer: COMMERCIAL

## 2025-04-17 ENCOUNTER — PHARMACY VISIT (OUTPATIENT)
Dept: PHARMACY | Facility: CLINIC | Age: 60
End: 2025-04-17
Payer: COMMERCIAL

## 2025-04-17 PROCEDURE — RXMED WILLOW AMBULATORY MEDICATION CHARGE

## 2025-05-08 ENCOUNTER — APPOINTMENT (OUTPATIENT)
Dept: OPHTHALMOLOGY | Facility: CLINIC | Age: 60
End: 2025-05-08
Payer: COMMERCIAL

## 2025-05-19 ENCOUNTER — APPOINTMENT (OUTPATIENT)
Dept: RADIOLOGY | Facility: HOSPITAL | Age: 60
End: 2025-05-19
Payer: COMMERCIAL

## 2025-05-21 ENCOUNTER — APPOINTMENT (OUTPATIENT)
Dept: RADIOLOGY | Facility: HOSPITAL | Age: 60
End: 2025-05-21
Payer: COMMERCIAL

## 2025-05-21 PROCEDURE — RXMED WILLOW AMBULATORY MEDICATION CHARGE

## 2025-05-22 ENCOUNTER — PHARMACY VISIT (OUTPATIENT)
Dept: PHARMACY | Facility: CLINIC | Age: 60
End: 2025-05-22
Payer: COMMERCIAL

## 2025-05-22 DIAGNOSIS — E11.65 TYPE 2 DIABETES MELLITUS WITH HYPERGLYCEMIA, UNSPECIFIED WHETHER LONG TERM INSULIN USE (MULTI): Primary | ICD-10-CM

## 2025-05-22 PROCEDURE — RXMED WILLOW AMBULATORY MEDICATION CHARGE

## 2025-05-22 RX ORDER — INSULIN GLARGINE-YFGN 100 [IU]/ML
60 INJECTION, SOLUTION SUBCUTANEOUS 2 TIMES DAILY
Qty: 90 ML | Refills: 4 | Status: SHIPPED | OUTPATIENT
Start: 2025-05-22 | End: 2026-05-22

## 2025-05-28 ENCOUNTER — APPOINTMENT (OUTPATIENT)
Dept: ENDOCRINOLOGY | Facility: CLINIC | Age: 60
End: 2025-05-28
Payer: COMMERCIAL

## 2025-05-28 VITALS
DIASTOLIC BLOOD PRESSURE: 70 MMHG | SYSTOLIC BLOOD PRESSURE: 128 MMHG | HEART RATE: 108 BPM | BODY MASS INDEX: 38.84 KG/M2 | WEIGHT: 277.4 LBS | HEIGHT: 71 IN

## 2025-05-28 DIAGNOSIS — E11.65 TYPE 2 DIABETES MELLITUS WITH HYPERGLYCEMIA, UNSPECIFIED WHETHER LONG TERM INSULIN USE (MULTI): Primary | ICD-10-CM

## 2025-05-28 DIAGNOSIS — Z79.4 TYPE 2 DIABETES MELLITUS WITH HYPERGLYCEMIA, WITH LONG-TERM CURRENT USE OF INSULIN: ICD-10-CM

## 2025-05-28 DIAGNOSIS — E11.65 TYPE 2 DIABETES MELLITUS WITH HYPERGLYCEMIA, WITH LONG-TERM CURRENT USE OF INSULIN: ICD-10-CM

## 2025-05-28 PROCEDURE — 3008F BODY MASS INDEX DOCD: CPT | Performed by: INTERNAL MEDICINE

## 2025-05-28 PROCEDURE — 3074F SYST BP LT 130 MM HG: CPT | Performed by: INTERNAL MEDICINE

## 2025-05-28 PROCEDURE — 99214 OFFICE O/P EST MOD 30 MIN: CPT | Performed by: INTERNAL MEDICINE

## 2025-05-28 PROCEDURE — 3078F DIAST BP <80 MM HG: CPT | Performed by: INTERNAL MEDICINE

## 2025-05-28 ASSESSMENT — ENCOUNTER SYMPTOMS
NECK PAIN: 1
CONSTIPATION: 1
NERVOUS/ANXIOUS: 1
MYALGIAS: 1
APNEA: 1
UNEXPECTED WEIGHT CHANGE: 1
BACK PAIN: 1
ARTHRALGIAS: 1

## 2025-05-28 NOTE — PATIENT INSTRUCTIONS
Thank you for choosing [facility]  for your health care needs.  If you have any questions, concerns or medical needs, please feel free to contact our office at [phone].    Please ensure you complete your blood work one week before the next scheduled appointment.    To continue Lantus 60 units SQ twice daily  Please restart an insulin sliding scale with NovoLog as follows:  150-200mg/dL - 4 units  201-250mg/dL - 6 units   251-300 mg/dL - 8 units   301-350mg/dL - 10 units   >351mg/dL - 12 units  To discontinue the use of Trulicity   Mounjaro contraindicated given retinopathy   Counseled that the goal A1C should be closer to 7%  Please rotate insulin injection sites  Clinical pharmacy referral for insulin pump and Dexcom set up   For follow up in 3 months

## 2025-05-28 NOTE — PROGRESS NOTES
"Subjective   Jose Viera is a 59 y.o. male who presents for follow-up of Type 2 diabetes mellitus. The initial diagnosis of diabetes was made in 2002  . He was last seen in April 2023.      His father in law passed away.      He was found to have moderate nonproliferation diabetic retinopathy of both eyes with macular edema.      He received the Omnipod insulin pump and Dexcom CGM but was never contacted with regards to training.      Known complications due to diabetes included retinopathy    Cardiovascular risk factors include advanced age (older than 55 for men, 65 for women), diabetes mellitus, male gender, and obesity (BMI >= 30 kg/m2). The patient is on an ACE inhibitor or angiotensin II receptor blocker.  The patient has not been previously hospitalized due to diabetic ketoacidosis.     Current symptoms/problems include hyperglycemia. His clinical course has been stable.     The patient is currently checking the blood glucose.    Patient is using: glucometer  30-300mg/dL     Hypoglycemia frequency:  every couple of days  Hypoglycemia awareness: Yes      Current Medication Regimen  Lantus  60 units SQ twice daily  Trulicity 4.5mg SQ once weekly     MEALS: eating smaller meals      Exercise regimen  - denies   Needs knee replacement; left ankle is essentially fused  Walking is limited     Review of Systems   Constitutional:  Positive for unexpected weight change (Weight gain).   Eyes:  Positive for visual disturbance.   Respiratory:  Positive for apnea.    Gastrointestinal:  Positive for constipation.   Musculoskeletal:  Positive for arthralgias, back pain, myalgias and neck pain.   Psychiatric/Behavioral:  The patient is nervous/anxious.         Depression    All other systems reviewed and are negative.      Objective   /70   Pulse 108   Ht 1.803 m (5' 11\")   Wt 126 kg (277 lb 6.4 oz)   BMI 38.69 kg/m²   Physical Exam  Vitals and nursing note reviewed.   Constitutional:       General: He is not in " acute distress.     Appearance: Normal appearance. He is obese.   HENT:      Head: Normocephalic and atraumatic.      Nose: Nose normal.      Mouth/Throat:      Mouth: Mucous membranes are moist.   Eyes:      Extraocular Movements: Extraocular movements intact.   Pulmonary:      Effort: Pulmonary effort is normal.   Musculoskeletal:         General: Normal range of motion.   Neurological:      Mental Status: He is alert and oriented to person, place, and time.   Psychiatric:         Mood and Affect: Mood normal.         Lab Review  Glucose (mg/dL)   Date Value   06/05/2024 491 (HH)   11/22/2023 198 (H)   11/15/2023 142 (H)     POC HEMOGLOBIN A1c (%)   Date Value   08/20/2024 9.3 (A)     Hemoglobin A1C (%)   Date Value   04/27/2023 10.1 (A)   09/29/2020 9.4   03/05/2020 13.1     Bicarbonate (mmol/L)   Date Value   06/05/2024 34 (H)   11/22/2023 29   11/15/2023 31     Urea Nitrogen (mg/dL)   Date Value   06/05/2024 18   11/22/2023 34 (H)   11/15/2023 32 (H)     Creatinine (mg/dL)   Date Value   06/05/2024 0.93   11/22/2023 1.61 (H)   11/15/2023 1.09     Lab Results   Component Value Date    CHOL 158 06/05/2024    CHOL 145 04/27/2023    CHOL 167 09/29/2020     Lab Results   Component Value Date    HDL 61.9 06/05/2024    HDL 65.8 04/27/2023    HDL 75.5 09/29/2020     Lab Results   Component Value Date    LDLCALC 65 06/05/2024     Lab Results   Component Value Date    TRIG 155 (H) 06/05/2024    TRIG 81 04/27/2023    TRIG 52 09/29/2020       Health Maintenance:   Foot Exam:   Eye Exam: March and April 2025  Urine Albumin:  April 27, 2023    Assessment/Plan      59-year-old male presents for follow-up for type 2 diabetes. His blood pressure is at goal today.     Type 2 diabetes mellitus with hyperglycemia (Multi)  To continue Lantus 60 units SQ twice daily  Please restart an insulin sliding scale with NovoLog as follows:  150-200mg/dL - 4 units  201-250mg/dL - 6 units   251-300 mg/dL - 8 units   301-350mg/dL - 10 units    >351mg/dL - 12 units  To discontinue the use of Trulicity given retinopathy   Mounjaro contraindicated given retinopathy   Counseled that the goal A1C should be closer to 7%  Please rotate insulin injection sites  To obtain blood tests   Clinical pharmacy referral for insulin pump and Dexcom set up   For follow up in 3 months

## 2025-05-30 RX ORDER — INSULIN GLARGINE 100 [IU]/ML
60 INJECTION, SOLUTION SUBCUTANEOUS 2 TIMES DAILY
Qty: 45 ML | Refills: 5 | Status: SHIPPED | OUTPATIENT
Start: 2025-05-30 | End: 2025-11-26

## 2025-05-30 RX ORDER — INSULIN ASPART 100 [IU]/ML
INJECTION, SOLUTION INTRAVENOUS; SUBCUTANEOUS
Qty: 30 ML | Refills: 5 | Status: SHIPPED | OUTPATIENT
Start: 2025-05-30

## 2025-05-30 NOTE — ASSESSMENT & PLAN NOTE
To continue Lantus 60 units SQ twice daily  Please restart an insulin sliding scale with NovoLog as follows:  150-200mg/dL - 4 units  201-250mg/dL - 6 units   251-300 mg/dL - 8 units   301-350mg/dL - 10 units   >351mg/dL - 12 units  To discontinue the use of Trulicity given retinopathy   Mounjaro contraindicated given retinopathy   Counseled that the goal A1C should be closer to 7%  Please rotate insulin injection sites  To obtain blood tests   Clinical pharmacy referral for insulin pump and Dexcom set up   For follow up in 3 months

## 2025-05-31 DIAGNOSIS — Z79.4 TYPE 2 DIABETES MELLITUS WITH HYPERGLYCEMIA, WITH LONG-TERM CURRENT USE OF INSULIN: ICD-10-CM

## 2025-05-31 DIAGNOSIS — E11.65 TYPE 2 DIABETES MELLITUS WITH HYPERGLYCEMIA, WITH LONG-TERM CURRENT USE OF INSULIN: ICD-10-CM

## 2025-06-02 ENCOUNTER — APPOINTMENT (OUTPATIENT)
Dept: ENDOCRINOLOGY | Facility: CLINIC | Age: 60
End: 2025-06-02
Payer: COMMERCIAL

## 2025-06-02 RX ORDER — INSULIN ASPART 100 [IU]/ML
INJECTION, SOLUTION INTRAVENOUS; SUBCUTANEOUS
Qty: 30 ML | Refills: 5 | OUTPATIENT
Start: 2025-06-02

## 2025-06-04 ENCOUNTER — APPOINTMENT (OUTPATIENT)
Dept: RADIOLOGY | Facility: HOSPITAL | Age: 60
End: 2025-06-04
Payer: COMMERCIAL

## 2025-06-06 ENCOUNTER — APPOINTMENT (OUTPATIENT)
Dept: ENDOCRINOLOGY | Facility: CLINIC | Age: 60
End: 2025-06-06
Payer: COMMERCIAL

## 2025-06-06 NOTE — PROGRESS NOTES
"Patient is sent at the request of No ref. provider found for my opinion regarding diabetes.  My recommendations below will be communicated back to the requesting provider by way of shared medical record.    Recommendations: ***    ________________________________________________________________________    Subjective   Past Medical History:  Problem List[1]    Had the G7.  Has all Omnipod supplies, but never got trained on it.     Dexcom G7, but not using.   1:30    HPI:  Louis Viera \"Bill\" is a 59 y.o. male with a PMH significant for ***  Pt presents today for {VISIT; NEW, FOLLOW UP, CONSULT:11764} with endo PharmD for {Diabetes type:21102::\"Type 2 Diabetes Mellitus\"}  Last seen by {Endo providers:46866} on *** where ***    Today:   ***  ***    ****************************************            ****************************************        ****************************************      Diabetes Pharmacotherapy:  ***  ***    Adherence: {Adherence:34709}    Previously trialed meds: ***  ***    Social:  Current diet: {diet habits:42436}  Breakfast -   Lunch -   Dinner -   Snack -   Fluids -   Current exercise: {exercise types:37522}      Allergies:  Armodafinil and Fentanyl    Medication list:  Current Outpatient Medications   Medication Instructions    blood sugar diagnostic strip     blood-glucose meter,continuous (Dexcom G7 ) misc Use as instructed    blood-glucose sensor (Dexcom G7 Sensor) device For continuous glucose monitoring.  Change every 10 days.    buPROPion XL (WELLBUTRIN XL) 300 mg, Every morning    celecoxib (CeleBREX) 200 mg capsule 1 capsule, Daily (0630)    DULoxetine (Cymbalta) 60 mg DR capsule TAKE 1 CAPSULE BY MOUTH DAILY. TO FOLLOW 30 MG CAPSULE    escitalopram (LEXAPRO) 20 mg, Daily    gabapentin (NEURONTIN) 300 mg, 2 times daily    insulin glargine (LANTUS) 60 Units, subcutaneous, 2 times daily, Take as directed per insulin instructions.    insulin lispro (HumaLOG) 100 unit/mL pen " To be used three times daily with sliding scale.  Max of 36 units per day    insulin pump cart,auto,BT,G6/7 (Omnipod 5 G6-G7 Pods, Gen 5,) cartridge 1 each, subcutaneous, Every 72 hours    insulin pump cart,auto,BT-cntr (Omnipod 5 G6 Intro Kit, Gen 5,) cartridge 1 each, subcutaneous, Every 72 hours    lamoTRIgine (LaMICtal) 150 mg tablet 1 tablet, Daily    lancets (Accu-Chek Fastclix Lancet Drum) misc Testing twice daily    lidocaine HCL 4 % adhesive patch,medicated 1 each, topical (top), Daily PRN    methocarbamol (ROBAXIN) 750 mg, Daily    MULTIVITAMIN ORAL 1 tablet, Daily    mupirocin (Bactroban) 2 % ointment Topical, 3 times daily, apply to affected area    oxyCODONE-acetaminophen (Percocet) 7.5-325 mg tablet 1 tablet, 3 times daily    QUEtiapine (SEROQUEL) 150 mg, Nightly        Objective   Last Recorded Vitals:  BP Readings from Last 3 Encounters:   05/28/25 128/70   11/25/24 122/80   11/18/24 144/82     Wt Readings from Last 3 Encounters:   05/28/25 126 kg (277 lb 6.4 oz)   11/25/24 123 kg (271 lb)   11/18/24 120 kg (265 lb)     BMI Readings from Last 1 Encounters:   05/28/25 38.69 kg/m²      Labs  A1C  Lab Results   Component Value Date    HGBA1C 9.3 (A) 08/20/2024    HGBA1C 10.1 (A) 04/27/2023    HGBA1C 9.4 09/29/2020     BMP/LFTs  Lab Results   Component Value Date    CREATININE 0.93 06/05/2024    CREATININE 1.61 (H) 11/22/2023    CREATININE 1.09 11/15/2023    EGFR >90 06/05/2024    EGFR 50 (L) 11/22/2023    EGFR 80 11/15/2023    GLUCOSE 491 (HH) 06/05/2024     (L) 06/05/2024    K 4.8 06/05/2024    CL 93 (L) 06/05/2024    CALCIUM 9.6 06/05/2024    CO2 34 (H) 06/05/2024    BUN 18 06/05/2024    ALT 12 06/05/2024    AST 9 06/05/2024    ALKPHOS 109 06/05/2024    BILITOT 0.5 06/05/2024     Lipids  Lab Results   Component Value Date    TRIG 155 (H) 06/05/2024    CHOL 158 06/05/2024    LDLF 63 04/27/2023    LDLCALC 65 06/05/2024    HDL 61.9 06/05/2024     Urine Albumin Creatinine Ratio  Lab Results  "  Component Value Date    MICROALBCREA SEE COMMENT 2023    MICROALBCREA 11.5 2020     ASCVD risk  The 10-year ASCVD risk score (Lulu ZEPEDA, et al., 2019) is: 8.3%    Values used to calculate the score:      Age: 59 years      Sex: Male      Is Non- : No      Diabetic: Yes      Tobacco smoker: No      Systolic Blood Pressure: 128 mmHg      Is BP treated: No      HDL Cholesterol: 61.9 mg/dL      Total Cholesterol: 158 mg/dL    Additional labs:  No results found for: \"FRUCTOSAMINE\", \"CPEPTIDE\", \"NYV93AY\", \"NTIB\", \"ZNT8A\", \"INSAB\"    Home glucose monitoring:  Hypoglycemia: {Lows:42623}  ***      Assessment/Plan   {Diabetes type:71950::\"Type 2 Diabetes Mellitus\"}  Goal A1C <***  Plan:  Start ***  Discontinue ***  Increase ***  Decrease ***  Continue ***  Home glucose monitoring:   {Home glucose monitorin}  A minimum of 72 hours of CGM data was reviewed and used to make therapy changes. ***  Education Provided to Patient:   ***   Hypertension:   Goal BP <130/80 ***   Last ***/3 clinic readings *** goal  Denies s/sx of hyper-/hypo-tension ***  Current pharmacotherapy: ***  ***  {Primary/Secondary:42496} prevention:   Therapy: {Statin:37846}   {Lipid specific results and goals:5735} (***/***)  Renal:  CKD: {stage:40907815}  ACR: {ACR:20986} (***/***)  Renal protective agents: {Renal protective agents:28338}  DM medications are dosed appropriately for renal function  Labs: up to date ***  PharmD follow-up: {follow up:53115}  Endo follow-up: ***    Patient agreeable to plan as above, contact information provided for any future questions or concerns.    Shanta Zamora PharmD    Type of encounter: {visit type:00273}  Provider on site: {Endo providers:16886}    Continue all meds under the continuation of care with the referring provider and clinical pharmacy team.           [1]   Patient Active Problem List  Diagnosis    Benign hypertensive heart disease    Chronic pain syndrome    " Degenerative disc disease, cervical    Depression, recurrent    Psychogenic impotence    Generalized anxiety disorder    Hemangioma of skin and subcutaneous tissue    Hyperlipidemia    Insomnia    Melanocytic nevi, unspecified    Morbid obesity (Multi)    MSH2-related Jean syndrome (HNPCC1)    Obstructive sleep apnea syndrome    Actinic keratosis    Skin changes due to chronic exposure to nonionizing radiation, unspecified    Personal history of malignant melanoma of skin    Polyp of colon    Primary osteoarthritis of right knee    PSVT (paroxysmal supraventricular tachycardia)    Traumatic arthropathy of ankle    Type 2 diabetes mellitus with hyperglycemia (Multi)    Arthritis of shoulder region, right    Long-term insulin use (Multi)    Arthritis of right shoulder region    Complete tear of right rotator cuff    Biceps tendinitis of right shoulder    Myopia of both eyes with astigmatism and presbyopia    Combined forms of age-related cataract of both eyes    Elevated hemidiaphragm    Weight gain    Moderate nonproliferative diabetic retinopathy of both eyes with macular edema associated with type 2 diabetes mellitus

## 2025-06-10 NOTE — TELEPHONE ENCOUNTER
Pt called, he needs all his notes and summary visits regarding his staph infections sent to his pain management doctor. Dr. Briones at Fax number [phone]. There should be records from both [facility] and [facility] regarding this. Thanks. JW   [facility] is full.  No show letter sent.

## 2025-06-25 ENCOUNTER — APPOINTMENT (OUTPATIENT)
Dept: OPHTHALMOLOGY | Facility: CLINIC | Age: 60
End: 2025-06-25
Payer: COMMERCIAL

## 2025-06-30 ENCOUNTER — APPOINTMENT (OUTPATIENT)
Dept: OPHTHALMOLOGY | Facility: CLINIC | Age: 60
End: 2025-06-30
Payer: COMMERCIAL

## 2025-06-30 DIAGNOSIS — E11.3413 CONTROLLED TYPE 2 DIABETES MELLITUS WITH BOTH EYES AFFECTED BY SEVERE NONPROLIFERATIVE RETINOPATHY AND MACULAR EDEMA, WITH LONG-TERM CURRENT USE OF INSULIN: Primary | ICD-10-CM

## 2025-06-30 DIAGNOSIS — E11.3311 MODERATE NONPROLIFERATIVE DIABETIC RETINOPATHY OF RIGHT EYE WITH MACULAR EDEMA ASSOCIATED WITH TYPE 2 DIABETES MELLITUS: ICD-10-CM

## 2025-06-30 DIAGNOSIS — Z79.4 CONTROLLED TYPE 2 DIABETES MELLITUS WITH BOTH EYES AFFECTED BY SEVERE NONPROLIFERATIVE RETINOPATHY AND MACULAR EDEMA, WITH LONG-TERM CURRENT USE OF INSULIN: Primary | ICD-10-CM

## 2025-06-30 PROCEDURE — 67028 INJECTION EYE DRUG: CPT | Mod: RIGHT SIDE

## 2025-06-30 PROCEDURE — 92134 CPTRZ OPH DX IMG PST SGM RTA: CPT

## 2025-06-30 RX ADMIN — CIPROFLOXACIN HYDROCHLORIDE 1.25 MG: 500 TABLET ORAL at 13:00

## 2025-06-30 ASSESSMENT — EXTERNAL EXAM - RIGHT EYE: OD_EXAM: NORMAL

## 2025-06-30 ASSESSMENT — VISUAL ACUITY
METHOD: SNELLEN - LINEAR
OD_CC: 20/40
OS_CC: 20/30

## 2025-06-30 ASSESSMENT — TONOMETRY
IOP_METHOD: GOLDMANN APPLANATION
OS_IOP_MMHG: 14
OD_IOP_MMHG: 14

## 2025-06-30 ASSESSMENT — SLIT LAMP EXAM - LIDS
COMMENTS: NORMAL
COMMENTS: NORMAL

## 2025-06-30 ASSESSMENT — CUP TO DISC RATIO
OD_RATIO: 0.1
OS_RATIO: 0.1

## 2025-06-30 ASSESSMENT — EXTERNAL EXAM - LEFT EYE: OS_EXAM: NORMAL

## 2025-07-12 ENCOUNTER — APPOINTMENT (OUTPATIENT)
Dept: RADIOLOGY | Facility: HOSPITAL | Age: 60
End: 2025-07-12
Payer: COMMERCIAL

## 2025-07-12 ENCOUNTER — PHARMACY VISIT (OUTPATIENT)
Dept: PHARMACY | Facility: CLINIC | Age: 60
End: 2025-07-12
Payer: COMMERCIAL

## 2025-07-12 ENCOUNTER — HOSPITAL ENCOUNTER (EMERGENCY)
Facility: HOSPITAL | Age: 60
Discharge: HOME | End: 2025-07-12
Attending: STUDENT IN AN ORGANIZED HEALTH CARE EDUCATION/TRAINING PROGRAM
Payer: COMMERCIAL

## 2025-07-12 VITALS
DIASTOLIC BLOOD PRESSURE: 90 MMHG | BODY MASS INDEX: 38.5 KG/M2 | HEIGHT: 71 IN | SYSTOLIC BLOOD PRESSURE: 171 MMHG | HEART RATE: 72 BPM | TEMPERATURE: 98.1 F | RESPIRATION RATE: 18 BRPM | WEIGHT: 275 LBS | OXYGEN SATURATION: 96 %

## 2025-07-12 DIAGNOSIS — M25.562 ACUTE PAIN OF LEFT KNEE: Primary | ICD-10-CM

## 2025-07-12 PROCEDURE — RXMED WILLOW AMBULATORY MEDICATION CHARGE

## 2025-07-12 PROCEDURE — 99284 EMERGENCY DEPT VISIT MOD MDM: CPT | Mod: 25 | Performed by: STUDENT IN AN ORGANIZED HEALTH CARE EDUCATION/TRAINING PROGRAM

## 2025-07-12 PROCEDURE — 93971 EXTREMITY STUDY: CPT

## 2025-07-12 PROCEDURE — 93971 EXTREMITY STUDY: CPT | Performed by: RADIOLOGY

## 2025-07-12 PROCEDURE — 73564 X-RAY EXAM KNEE 4 OR MORE: CPT | Mod: LT

## 2025-07-12 PROCEDURE — 73564 X-RAY EXAM KNEE 4 OR MORE: CPT | Mod: LEFT SIDE

## 2025-07-12 RX ORDER — PREDNISONE 50 MG/1
50 TABLET ORAL DAILY
Qty: 5 TABLET | Refills: 0 | Status: SHIPPED | OUTPATIENT
Start: 2025-07-12 | End: 2025-07-17

## 2025-07-12 RX ORDER — ACETAMINOPHEN 500 MG
1000 TABLET ORAL EVERY 6 HOURS PRN
Qty: 30 TABLET | Refills: 0 | Status: SHIPPED | OUTPATIENT
Start: 2025-07-12 | End: 2025-07-22

## 2025-07-12 RX ORDER — METHOCARBAMOL 500 MG/1
500 TABLET, FILM COATED ORAL 2 TIMES DAILY
Qty: 20 TABLET | Refills: 0 | Status: SHIPPED | OUTPATIENT
Start: 2025-07-12 | End: 2025-07-22

## 2025-07-12 ASSESSMENT — LIFESTYLE VARIABLES
EVER HAD A DRINK FIRST THING IN THE MORNING TO STEADY YOUR NERVES TO GET RID OF A HANGOVER: NO
HAVE PEOPLE ANNOYED YOU BY CRITICIZING YOUR DRINKING: NO
TOTAL SCORE: 0
HAVE YOU EVER FELT YOU SHOULD CUT DOWN ON YOUR DRINKING: NO
EVER FELT BAD OR GUILTY ABOUT YOUR DRINKING: NO

## 2025-07-12 ASSESSMENT — PAIN - FUNCTIONAL ASSESSMENT: PAIN_FUNCTIONAL_ASSESSMENT: 0-10

## 2025-07-12 ASSESSMENT — PAIN DESCRIPTION - ORIENTATION: ORIENTATION: LEFT

## 2025-07-12 ASSESSMENT — PAIN DESCRIPTION - LOCATION: LOCATION: LEG

## 2025-07-12 ASSESSMENT — PAIN SCALES - GENERAL: PAINLEVEL_OUTOF10: 8

## 2025-07-12 NOTE — ED PROVIDER NOTES
Emergency Department Provider Note       History of Present Illness     History provided by: Patient  Limitations to History: None  External Records Reviewed with Brief Summary: None    HPI:  Louis Viera is a 59 y.o. male with past medical history of diabetes, hypertension, hyperlipidemia, chronic pain syndrome presents ED with concerns for left knee pain patient says he fell on his left knee 2 weeks ago.  It was doing fine up until 2 days ago he started having some pain behind his left knee.  Pain radiates into the calf.  Feels like the leg may be little bit more swollen than usual.  Denies any reinjury.  No numbness to the foot.  No fevers or chills.    Physical Exam   Triage vitals:  T 36.7 °C (98.1 °F)  HR 95  BP (!) 189/96  RR 18  O2 97 % None (Room air)    Physical Exam  Vitals and nursing note reviewed.   Constitutional:       General: He is not in acute distress.     Appearance: Normal appearance. He is not ill-appearing.   HENT:      Head: Atraumatic.      Mouth/Throat:      Mouth: Mucous membranes are moist.      Pharynx: Oropharynx is clear.   Eyes:      Extraocular Movements: Extraocular movements intact.   Cardiovascular:      Rate and Rhythm: Normal rate and regular rhythm.      Pulses: Normal pulses.      Heart sounds: No murmur heard.  Pulmonary:      Effort: Pulmonary effort is normal. No respiratory distress.      Breath sounds: Normal breath sounds. No wheezing or rhonchi.   Abdominal:      General: Abdomen is flat.      Palpations: Abdomen is soft.      Tenderness: There is no abdominal tenderness.   Musculoskeletal:      Right lower leg: No edema.      Left lower leg: No edema.      Comments: Tenderness with palpation to the posterior aspect of the left knee and posterior calf.  Mild edema to the leg.  Normal DP and PT pulse left foot.  Patellar and quad tendons appear to be grossly intact.  Sensation intact to the left foot.   Skin:     General: Skin is warm and dry.      Capillary  Refill: Capillary refill takes less than 2 seconds.   Neurological:      General: No focal deficit present.      Mental Status: He is alert.           Medical Decision Making & ED Course   Medical Decision Makin y.o. male presents ED with left knee pain.  Vital stable.  Obtain x-ray of the left knee and ultrasound left lower extremity.  Imaging negative for acute pathology.  I have low suspicion for septic arthritis given patient has minimal pain with passive range of motion of the knee.  Patient does note that this pain actually seems to start up in his left buttocks and radiates down to his left foot.  Given this additional history of symptoms may be piriformis syndrome.  With workup otherwise negative will discharge patient home with symptomatic management.  Recommend that he follow-up with PCP.  Advised to come back to ED for any worsening symptoms.  ----      Differential diagnoses considered include but are not limited to: Knee fracture, dislocation, sprain, septic arthritis, osteoarthritis, piriformis syndrome        EKG Independent Interpretation: EKG not obtained    Independent Result Review and Interpretation: Relevant laboratory and radiographic results were reviewed and independently interpreted by myself.  As necessary, they are commented on in the ED Course.    Chronic conditions affecting the patient's care: As documented above in [facility]    The patient was discussed with the following consultants/services: None    Care Considerations: As documented above in [facility]    ED Course:  Diagnoses as of 25 1417   Acute pain of left knee       Disposition   As a result of the work-up, the patient was discharged home.  he was informed of his diagnosis and instructed to come back with any concerns or worsening of condition.  he and was agreeable to the plan as discussed above.  he was given the opportunity to ask questions.  All of the patient's questions were answered.    Procedures    Procedures        Kavon Ferrer DO  Emergency Medicine                                                       Kavon Ferrer DO  07/12/25 2956

## 2025-07-14 RX ORDER — CIPROFLOXACIN HYDROCHLORIDE 500 MG/1
1.25 TABLET ORAL
Status: COMPLETED | OUTPATIENT
Start: 2025-06-30 | End: 2025-06-30

## 2025-07-17 ENCOUNTER — APPOINTMENT (OUTPATIENT)
Dept: RADIOLOGY | Facility: HOSPITAL | Age: 60
End: 2025-07-17
Payer: COMMERCIAL

## 2025-07-17 ENCOUNTER — HOSPITAL ENCOUNTER (EMERGENCY)
Facility: HOSPITAL | Age: 60
Discharge: HOME | End: 2025-07-17
Attending: EMERGENCY MEDICINE
Payer: COMMERCIAL

## 2025-07-17 VITALS
RESPIRATION RATE: 18 BRPM | BODY MASS INDEX: 38.5 KG/M2 | TEMPERATURE: 97.7 F | HEIGHT: 71 IN | SYSTOLIC BLOOD PRESSURE: 169 MMHG | WEIGHT: 275 LBS | DIASTOLIC BLOOD PRESSURE: 89 MMHG | OXYGEN SATURATION: 99 % | HEART RATE: 81 BPM

## 2025-07-17 DIAGNOSIS — M79.605 LEFT LEG PAIN: Primary | ICD-10-CM

## 2025-07-17 LAB
ALBUMIN SERPL BCP-MCNC: 4 G/DL (ref 3.4–5)
ALP SERPL-CCNC: 132 U/L (ref 33–120)
ALT SERPL W P-5'-P-CCNC: 17 U/L (ref 10–52)
ANION GAP SERPL CALC-SCNC: 12 MMOL/L (ref 10–20)
AST SERPL W P-5'-P-CCNC: 11 U/L (ref 9–39)
BASOPHILS # BLD AUTO: 0.02 X10*3/UL (ref 0–0.1)
BASOPHILS NFR BLD AUTO: 0.2 %
BILIRUB DIRECT SERPL-MCNC: 0.1 MG/DL (ref 0–0.3)
BILIRUB SERPL-MCNC: 0.5 MG/DL (ref 0–1.2)
BUN SERPL-MCNC: 19 MG/DL (ref 6–23)
CALCIUM SERPL-MCNC: 9.7 MG/DL (ref 8.6–10.3)
CHLORIDE SERPL-SCNC: 91 MMOL/L (ref 98–107)
CK SERPL-CCNC: 54 U/L (ref 0–325)
CO2 SERPL-SCNC: 33 MMOL/L (ref 21–32)
CREAT SERPL-MCNC: 0.97 MG/DL (ref 0.5–1.3)
CRP SERPL-MCNC: 9.32 MG/DL
EGFRCR SERPLBLD CKD-EPI 2021: >90 ML/MIN/1.73M*2
EOSINOPHIL # BLD AUTO: 0.05 X10*3/UL (ref 0–0.7)
EOSINOPHIL NFR BLD AUTO: 0.5 %
ERYTHROCYTE [DISTWIDTH] IN BLOOD BY AUTOMATED COUNT: 12.3 % (ref 11.5–14.5)
ERYTHROCYTE [SEDIMENTATION RATE] IN BLOOD BY WESTERGREN METHOD: 70 MM/H (ref 0–20)
GLUCOSE SERPL-MCNC: 382 MG/DL (ref 74–99)
HCT VFR BLD AUTO: 46.2 % (ref 41–52)
HGB BLD-MCNC: 14.6 G/DL (ref 13.5–17.5)
IMM GRANULOCYTES # BLD AUTO: 0.04 X10*3/UL (ref 0–0.7)
IMM GRANULOCYTES NFR BLD AUTO: 0.4 % (ref 0–0.9)
LACTATE SERPL-SCNC: 1 MMOL/L (ref 0.4–2)
LYMPHOCYTES # BLD AUTO: 0.92 X10*3/UL (ref 1.2–4.8)
LYMPHOCYTES NFR BLD AUTO: 9.2 %
MCH RBC QN AUTO: 28.2 PG (ref 26–34)
MCHC RBC AUTO-ENTMCNC: 31.6 G/DL (ref 32–36)
MCV RBC AUTO: 89 FL (ref 80–100)
MONOCYTES # BLD AUTO: 0.29 X10*3/UL (ref 0.1–1)
MONOCYTES NFR BLD AUTO: 2.9 %
NEUTROPHILS # BLD AUTO: 8.7 X10*3/UL (ref 1.2–7.7)
NEUTROPHILS NFR BLD AUTO: 86.8 %
NRBC BLD-RTO: 0 /100 WBCS (ref 0–0)
PLATELET # BLD AUTO: 264 X10*3/UL (ref 150–450)
POTASSIUM SERPL-SCNC: 5 MMOL/L (ref 3.5–5.3)
PROT SERPL-MCNC: 8 G/DL (ref 6.4–8.2)
RBC # BLD AUTO: 5.17 X10*6/UL (ref 4.5–5.9)
SODIUM SERPL-SCNC: 131 MMOL/L (ref 136–145)
WBC # BLD AUTO: 10 X10*3/UL (ref 4.4–11.3)

## 2025-07-17 PROCEDURE — 75635 CT ANGIO ABDOMINAL ARTERIES: CPT

## 2025-07-17 PROCEDURE — 82550 ASSAY OF CK (CPK): CPT | Performed by: EMERGENCY MEDICINE

## 2025-07-17 PROCEDURE — 80048 BASIC METABOLIC PNL TOTAL CA: CPT | Performed by: EMERGENCY MEDICINE

## 2025-07-17 PROCEDURE — 99285 EMERGENCY DEPT VISIT HI MDM: CPT | Performed by: EMERGENCY MEDICINE

## 2025-07-17 PROCEDURE — 2550000001 HC RX 255 CONTRASTS: Performed by: EMERGENCY MEDICINE

## 2025-07-17 PROCEDURE — 85025 COMPLETE CBC W/AUTO DIFF WBC: CPT | Performed by: EMERGENCY MEDICINE

## 2025-07-17 PROCEDURE — 83605 ASSAY OF LACTIC ACID: CPT | Performed by: EMERGENCY MEDICINE

## 2025-07-17 PROCEDURE — 82248 BILIRUBIN DIRECT: CPT | Performed by: EMERGENCY MEDICINE

## 2025-07-17 PROCEDURE — 85652 RBC SED RATE AUTOMATED: CPT | Performed by: EMERGENCY MEDICINE

## 2025-07-17 PROCEDURE — 75635 CT ANGIO ABDOMINAL ARTERIES: CPT | Performed by: RADIOLOGY

## 2025-07-17 PROCEDURE — 86140 C-REACTIVE PROTEIN: CPT | Performed by: EMERGENCY MEDICINE

## 2025-07-17 PROCEDURE — 36415 COLL VENOUS BLD VENIPUNCTURE: CPT | Performed by: EMERGENCY MEDICINE

## 2025-07-17 RX ORDER — TRAMADOL HYDROCHLORIDE 50 MG/1
50 TABLET, FILM COATED ORAL EVERY 6 HOURS PRN
Qty: 12 TABLET | Refills: 0 | Status: SHIPPED | OUTPATIENT
Start: 2025-07-17 | End: 2025-07-20

## 2025-07-17 RX ADMIN — IOHEXOL 100 ML: 350 INJECTION, SOLUTION INTRAVENOUS at 14:50

## 2025-07-17 ASSESSMENT — PAIN DESCRIPTION - LOCATION: LOCATION: LEG

## 2025-07-17 ASSESSMENT — PAIN SCALES - GENERAL: PAINLEVEL_OUTOF10: 10 - WORST POSSIBLE PAIN

## 2025-07-17 ASSESSMENT — PAIN DESCRIPTION - ORIENTATION: ORIENTATION: LEFT

## 2025-07-17 ASSESSMENT — PAIN - FUNCTIONAL ASSESSMENT: PAIN_FUNCTIONAL_ASSESSMENT: 0-10

## 2025-07-17 NOTE — ED PROVIDER NOTES
HPI   CC: Leg Pain     Patient is a 59-year-old male with PMH diabetes, hypertension, hyperlipidemia, chronic pain presenting to the ED with left leg pain. He was seen at [facility] ED on 7/12/2025 with same symptoms. Describes pain as starting from behind his left knee and shoots and down his foot. Pain is described as burning and is worse with standing or moving his knee. It is 10/10. He has chronic pain syndrome and has pains all over his body but states that this pain feels different. At [place] x-ray of his knee and ultrasound were negative. He was told it was likely a compressed nerve or muscle strain and was given methocarbamol, Tylenol, and prednisone. He states his medications have not been working for him and his symptoms have worsened. He states that he now has redness and warmth in his popliteal fossa. He endorses falling on his knee 2 weeks ago and wife states that patient is prone to staph infections and they think he has an infected knee. Endorses maguire syndrome but denies recent travel or surgery, history of blood clot, hormone therapy.          ROS: 10-point review of systems was performed and is otherwise negative except as noted in HPI.    Limitations to history: N/A  Independent Historians: Self   External Records Reviewed: Outpatient notes in EMR    Past Medical History: Noncontributory except per HPI     Past Surgical History: Noncontributory except per HPI     Family History: Reviewed and noncontributory     Social History:  Denies tobacco. Denies ETOH. Denies illicit drugs.    RX Allergies[1]    Home Meds:   Current Outpatient Medications   Medication Instructions    acetaminophen (TYLENOL) 1,000 mg, oral, Every 6 hours PRN    blood sugar diagnostic strip     blood-glucose meter,continuous (Dexcom G7 ) misc Use as instructed    blood-glucose sensor (Dexcom G7 Sensor) device For continuous glucose monitoring.  Change every 10 days.    buPROPion XL (WELLBUTRIN XL) 300 mg, Every morning     celecoxib (CeleBREX) 200 mg capsule 1 capsule, Daily (0630)    DULoxetine (Cymbalta) 60 mg DR capsule TAKE 1 CAPSULE BY MOUTH DAILY. TO FOLLOW 30 MG CAPSULE    escitalopram (LEXAPRO) 20 mg, Daily    gabapentin (NEURONTIN) 300 mg, 2 times daily    insulin lispro (HumaLOG) 100 unit/mL pen To be used three times daily with sliding scale.  Max of 36 units per day    insulin pump cart,auto,BT,G6/7 (Omnipod 5 G6-G7 Pods, Gen 5,) cartridge 1 each, subcutaneous, Every 72 hours    insulin pump cart,auto,BT-cntr (Omnipod 5 G6 Intro Kit, Gen 5,) cartridge 1 each, subcutaneous, Every 72 hours    lamoTRIgine (LaMICtal) 150 mg tablet 1 tablet, Daily    lancets (Accu-Chek Fastclix Lancet Drum) misc Testing twice daily    Lantus Solostar U-100 Insulin 60 Units, subcutaneous, 2 times daily, Take as directed per insulin instructions.    lidocaine HCL 4 % adhesive patch,medicated 1 each, topical (top), Daily PRN    methocarbamol (ROBAXIN) 750 mg, Daily    methocarbamol (ROBAXIN) 500 mg, oral, 2 times daily    MULTIVITAMIN ORAL 1 tablet, Daily    mupirocin (Bactroban) 2 % ointment Topical, 3 times daily, apply to affected area    oxyCODONE-acetaminophen (Percocet) 7.5-325 mg tablet 1 tablet, 3 times daily    predniSONE (DELTASONE) 50 mg, oral, Daily    QUEtiapine (SEROQUEL) 150 mg, Nightly    traMADol (ULTRAM) 50 mg, oral, Every 6 hours PRN        Physical Exam     ED Triage Vitals [07/17/25 1214]   Temperature Heart Rate Respirations BP   36.5 °C (97.7 °F) (!) 101 18 127/84      Pulse Ox Temp Source Heart Rate Source Patient Position   94 % Temporal -- --      BP Location FiO2 (%)     -- --         Vitals and nursing note reviewed.   Physical Exam  Constitutional:       General: He is not in acute distress.     Appearance: Normal appearance. He is not ill-appearing, toxic-appearing or diaphoretic.   HENT:      Head: Normocephalic and atraumatic.      Nose: Nose normal.      Mouth/Throat:      Mouth: Mucous membranes are moist.       Pharynx: Oropharynx is clear. No oropharyngeal exudate or posterior oropharyngeal erythema.     Eyes:      General: No scleral icterus.        Right eye: No discharge.         Left eye: No discharge.      Extraocular Movements: Extraocular movements intact.      Conjunctiva/sclera: Conjunctivae normal.      Pupils: Pupils are equal, round, and reactive to light.       Cardiovascular:      Rate and Rhythm: Normal rate and regular rhythm.      Heart sounds: Normal heart sounds. No murmur heard.     No friction rub. No gallop.      Comments: Diminished DP and PT on left foot  Pulmonary:      Effort: Pulmonary effort is normal. No respiratory distress.      Breath sounds: Normal breath sounds. No stridor. No wheezing, rhonchi or rales.   Abdominal:      General: Abdomen is flat. Bowel sounds are normal. There is no distension.      Palpations: Abdomen is soft.      Tenderness: There is no abdominal tenderness. There is no right CVA tenderness, left CVA tenderness, guarding or rebound.     Musculoskeletal:         General: Normal range of motion.      Cervical back: Normal range of motion and neck supple. No rigidity or tenderness.      Comments: There is no joint deformity, crepitus, bruising, swelling. Compartments are soft. ROM full to bilateral hip flexion/extension/adduction/abduction/internal rotation/external rotation, knee flexion/extension, ankle dorsi/plantarflexion/inversion/eversion.    There is warmth and redness to the left popliteal fossa.  There is diffuse tenderness over the left knee.  3 cm increase in swelling in right leg compared to left.   Lymphadenopathy:      Cervical: No cervical adenopathy.     Skin:     General: Skin is warm and dry.      Capillary Refill: Capillary refill takes less than 2 seconds.     Neurological:      General: No focal deficit present.      Mental Status: He is alert and oriented to person, place, and time.     Psychiatric:         Mood and Affect: Mood normal.          Behavior: Behavior normal.         Diagnostic Results      Labs Reviewed   CBC WITH AUTO DIFFERENTIAL - Abnormal       Result Value    WBC 10.0      nRBC 0.0      RBC 5.17      Hemoglobin 14.6      Hematocrit 46.2      MCV 89      MCH 28.2      MCHC 31.6 (*)     RDW 12.3      Platelets 264      Neutrophils % 86.8      Immature Granulocytes %, Automated 0.4      Lymphocytes % 9.2      Monocytes % 2.9      Eosinophils % 0.5      Basophils % 0.2      Neutrophils Absolute 8.70 (*)     Immature Granulocytes Absolute, Automated 0.04      Lymphocytes Absolute 0.92 (*)     Monocytes Absolute 0.29      Eosinophils Absolute 0.05      Basophils Absolute 0.02     BASIC METABOLIC PANEL - Abnormal    Glucose 382 (*)     Sodium 131 (*)     Potassium 5.0      Chloride 91 (*)     Bicarbonate 33 (*)     Anion Gap 12      Urea Nitrogen 19      Creatinine 0.97      eGFR >90      Calcium 9.7     HEPATIC FUNCTION PANEL - Abnormal    Albumin 4.0      Bilirubin, Total 0.5      Bilirubin, Direct 0.1      Alkaline Phosphatase 132 (*)     ALT 17      AST 11      Total Protein 8.0     C-REACTIVE PROTEIN - Abnormal    C-Reactive Protein 9.32 (*)    SEDIMENTATION RATE, AUTOMATED - Abnormal    Sedimentation Rate 70 (*)    LACTATE - Normal    Lactate 1.0      Narrative:     Venipuncture immediately after or during the administration of Metamizole may lead to falsely low results. Testing should be performed immediately prior to Metamizole dosing.   CREATINE KINASE - Normal    Creatine Kinase 54           CT angio aorta and bilateral iliofemoral runoff including without contrast if performed   Final Result   An imaging explanation for intractable popliteal fossa pain and   feeble pulses is not identified. Specifically, unremarkable CTA   abdomen and pelvis with runoff.        Right knee suprapatellar joint effusion, bipartite patella with   degenerative changes and femorotibial joint degenerative changes.        Left lower extremity marked  degenerative/posttraumatic changes of the   ankle.        Signed by: Babar Hernandez 7/17/2025 5:03 PM   Dictation workstation:   [id number]          ED Course & MDM   Assessment/Plan:   Medications   iohexol (OMNIPaque) 350 mg iodine/mL solution 100 mL (100 mL intravenous Given 7/17/25 1450)      ED Course as of 07/17/25 1838   Thu Jul 17, 2025 1835 Patient is a 59-year-old male presenting to the ED with concern for left knee/leg pain.  Vital signs are stable, patient is nontoxic-appearing.  Patient is neurovascularly intact in the left lower extremity with soft compartments.  Lower suspicion for compartment syndrome.  There is no ligamentous instability on exam.  There is redness and warmth in the left popliteal fossa, but patient has maintained range of motion of his left knee, lower suspicion for septic arthritis or crystalline arthropathy.  Duplex ultrasound obtained before was negative for any acute DVT and patient actually has more swelling in his good leg compared to the left.  He does have diminished pulses in the left lower extremity, suspicion for PAD or acute arterial occlusion.  CBC with no leukocytosis, no signs of anemia, normal platelets.  BMP with sodium of 131, no other electrolyte abnormalities and normal kidney function.  Hepatic function normal.  Lactate normal.  CPK normal.  Elevated CRP and sed.  CTA was negative for any acute findings.  Patient staffed with attending physician Dr. Redd.  He is appropriate for outpatient management.  Patient will be given tramadol and recommend follow-up with PCP and pain management.  Patient understands and is agreeable with plan. Patient told to return to ED for any new or worsening symptoms as outlined in discharge summary. [VS]      ED Course User Index  [VS] Flores Cook PA-C         Diagnoses as of 07/17/25 1838   Left leg pain       ED Prescriptions       Medication Sig Dispense Start Date End Date Auth. Provider    traMADol (Ultram) 50 mg  tablet Take 1 tablet (50 mg) by mouth every 6 hours if needed for severe pain (7 - 10) for up to 3 days. 12 tablet 7/17/2025 7/20/2025 Flores Cook PA-C            Chronic Medical Conditions Significantly Affecting Care:      Escalation of Care: Appropriate for outpatient management     Discussion of Management with Other Providers:  I discussed the patient/results with: attending physician Dr. Redd    Counseling: Spoke with the patient and discussed today´s findings, in addition to providing specific details for the plan of care and expected course.  Patient was given the opportunity to ask questions.    Discussed return precautions and importance of follow-up.  Advised to follow-up with PCP and pain management.  Advised to return to the ED for changing or worsening symptoms, new symptoms, complaint specific precautions, and precautions listed on the discharge paperwork.  Educated on the common potential side effects of medications prescribed.    I advised the patient that the emergency evaluation and treatment provided today doesn't end their need for medical care. It is very important that they follow-up with their primary care provider or other specialist as instructed.    The plan of care was mutually agreed upon with the patient. The patient and/or family were given the opportunity to ask questions. All questions asked today in the ED were answered to the best of my ability with today's information.    I specifically advised the patient to return to the ED for changing or worsening symptoms, worrisome new symptoms, or for any complaint specific precautions listed on the discharge paperwork.    Procedure  Procedures         [1]   Allergies  Allergen Reactions    Armodafinil Palpitations     tachycardia    Fentanyl Palpitations and Other     sweating, tachycardia         Flores Cook PA-C  07/17/25 8776

## 2025-07-17 NOTE — DISCHARGE INSTRUCTIONS
May take tramadol as needed for severe pain.  This is narcotic.  Do not drive, drink alcohol or operate heavy machinery while taking this medication.  Do not take other sedating medications when taking narcotics.  Narcotics can cause constipation.  Drink extra water and eat extra fiber to help prevent this. May take stool softners as needed.  Please follow-up with your primary doctor and pain management within 1 week to ensure symptoms are improving  Return to ED for any new or worsening symptoms include but limited to worsening pain, unable to move the joints of the leg, coolness of the leg, numbness, weakness, tingling, or any other concern

## 2025-07-17 NOTE — ED TRIAGE NOTES
Pt. To ED for left leg pain x1 week. Patient was seen Saturday and was given prednisone muscle relaxer with no relief. Patient states pain has worsen. No swelling redness or warmth noted. Patient unable to bear weight on left leg.

## 2025-07-29 ENCOUNTER — APPOINTMENT (OUTPATIENT)
Dept: PRIMARY CARE | Facility: CLINIC | Age: 60
End: 2025-07-29
Payer: COMMERCIAL

## 2025-07-29 VITALS
WEIGHT: 269 LBS | HEART RATE: 102 BPM | OXYGEN SATURATION: 95 % | BODY MASS INDEX: 37.52 KG/M2 | TEMPERATURE: 97.1 F | SYSTOLIC BLOOD PRESSURE: 158 MMHG | DIASTOLIC BLOOD PRESSURE: 90 MMHG

## 2025-07-29 DIAGNOSIS — M25.562 ACUTE PAIN OF LEFT KNEE: Primary | ICD-10-CM

## 2025-07-29 DIAGNOSIS — R74.8 ELEVATED ALKALINE PHOSPHATASE LEVEL: ICD-10-CM

## 2025-07-29 DIAGNOSIS — R25.1 TREMOR: ICD-10-CM

## 2025-07-29 PROCEDURE — 3080F DIAST BP >= 90 MM HG: CPT | Performed by: FAMILY MEDICINE

## 2025-07-29 PROCEDURE — 3077F SYST BP >= 140 MM HG: CPT | Performed by: FAMILY MEDICINE

## 2025-07-29 PROCEDURE — 99214 OFFICE O/P EST MOD 30 MIN: CPT | Performed by: FAMILY MEDICINE

## 2025-07-29 RX ORDER — DICLOFENAC SODIUM 75 MG/1
75 TABLET, DELAYED RELEASE ORAL 2 TIMES DAILY
Qty: 14 TABLET | Refills: 0 | Status: SHIPPED | OUTPATIENT
Start: 2025-07-29 | End: 2025-08-05

## 2025-07-29 ASSESSMENT — ENCOUNTER SYMPTOMS
FEVER: 0
COUGH: 0
ARTHRALGIAS: 1

## 2025-07-29 NOTE — PROGRESS NOTES
Subjective   Patient ID: Jose Viera is a 59 y.o. male who presents for ER Follow-up ([facility] on 7/17 Re:  L knee and leg pain).    Jose has been having pain in his left knee. Did have a fall one month go. Fell forward and landed on knee and scraped it up. He then developed pain on left knee. Pain located on left outer knee/posterior knee. Pain was more severe. Worse with standing or moving knee. Seen in ER and given prednisone and tylenol Did have some improvement but then pain worsened again. Seen a second time in ER. Vascular study done and negative. Was discharged home with tramadol which has helped somewhat. He still has pain in left outer knee. Swollen area has worsened. Is red and tender to touch. Pain with walking or bending knee. Requesting wheelchair because it hurts to use crutches.     He does endorse a tremor in left leg. Does not happen at rest. Occurs when lifting leg. No leg weakness. Does have family history of Parkinson's disease.            Review of Systems   Constitutional:  Negative for fever.   Respiratory:  Negative for cough.    Musculoskeletal:  Positive for arthralgias and gait problem.       Objective   /90   Pulse 102   Temp 36.2 °C (97.1 °F)   Wt 122 kg (269 lb)   SpO2 95%   BMI 37.52 kg/m²     Physical Exam  Constitutional:       General: He is not in acute distress.     Appearance: Normal appearance.   HENT:      Head: Normocephalic.   Pulmonary:      Effort: Pulmonary effort is normal.     Musculoskeletal:      Left knee: Swelling present. No crepitus. Normal range of motion. Tenderness present. No patellar tendon tenderness.      Comments: Swelling, redness and tenderness at fibular head.      Skin:     General: Skin is warm and dry.      Comments: Healing abrasion at anterior knee.     Neurological:      General: No focal deficit present.      Mental Status: He is alert.     Psychiatric:         Mood and Affect: Mood normal.         Assessment/Plan   Diagnoses and all  orders for this visit:  Acute pain of left knee  Comments:  Suspect fibular head bursitis. Check uric acid level to evalaute for gout. Start diclofenac. Continue to ice and elevate. Follow-up with orthopedics.  Orders:  -     Referral to Orthopedics and Sports Medicine; Future  -     Uric Acid; Future  -     diclofenac (Voltaren) 75 mg EC tablet; Take 1 tablet (75 mg) by mouth 2 times a day for 7 days. Do not crush, chew, or split.  -     miscellaneous medical supply misc; Bariatric wheelchair. Use daily as directed.  Elevated alkaline phosphatase level  Comments:  Check GGT to further evaluate.  Orders:  -     Comprehensive Metabolic Panel; Future  -     Gamma GT; Future  Tremor  Comments:  Suspect action tremor related to muscle spasms when lifitng leg. If not improving, plan to further evaluate.

## 2025-08-04 ENCOUNTER — PATIENT MESSAGE (OUTPATIENT)
Dept: PRIMARY CARE | Facility: CLINIC | Age: 60
End: 2025-08-04
Payer: COMMERCIAL

## 2025-08-04 ENCOUNTER — TELEPHONE (OUTPATIENT)
Dept: PRIMARY CARE | Facility: CLINIC | Age: 60
End: 2025-08-04
Payer: COMMERCIAL

## 2025-08-04 NOTE — TELEPHONE ENCOUNTER
Please let her know that I reached out to orthopedics and am waiting on a response to see if can get him in sooner.

## 2025-08-04 NOTE — TELEPHONE ENCOUNTER
Wife called stating that pt's knee pain is getting worse.  Wife states pt was seen last week and was told to call if not better by Friday.  Pt is not able to walk and still in severe pain.  Wife wants to know what the next step is.

## 2025-08-04 NOTE — TELEPHONE ENCOUNTER
Called wife and informed to reach out to scheduling to get appt with Dr. Del Angel since pt was referred last week  Wife wants to know what pt should do now since he cannot even walk to the bathroom.

## 2025-08-07 ENCOUNTER — OFFICE VISIT (OUTPATIENT)
Dept: ORTHOPEDIC SURGERY | Facility: HOSPITAL | Age: 60
End: 2025-08-07
Payer: COMMERCIAL

## 2025-08-07 VITALS — WEIGHT: 269 LBS | BODY MASS INDEX: 37.66 KG/M2 | HEIGHT: 71 IN

## 2025-08-07 DIAGNOSIS — M23.301 DEGENERATIVE TEAR OF LATERAL MENISCUS OF LEFT KNEE: Primary | ICD-10-CM

## 2025-08-07 DIAGNOSIS — S76.312A HAMSTRING STRAIN, LEFT, INITIAL ENCOUNTER: ICD-10-CM

## 2025-08-07 PROCEDURE — 20610 DRAIN/INJ JOINT/BURSA W/O US: CPT | Mod: LT

## 2025-08-07 PROCEDURE — 99214 OFFICE O/P EST MOD 30 MIN: CPT

## 2025-08-07 PROCEDURE — 2500000004 HC RX 250 GENERAL PHARMACY W/ HCPCS (ALT 636 FOR OP/ED)

## 2025-08-07 PROCEDURE — 99212 OFFICE O/P EST SF 10 MIN: CPT

## 2025-08-07 PROCEDURE — 3008F BODY MASS INDEX DOCD: CPT

## 2025-08-07 RX ORDER — TRIAMCINOLONE ACETONIDE 40 MG/ML
40 INJECTION, SUSPENSION INTRA-ARTICULAR; INTRAMUSCULAR
Status: COMPLETED | OUTPATIENT
Start: 2025-08-07 | End: 2025-08-07

## 2025-08-07 RX ORDER — LIDOCAINE HYDROCHLORIDE 10 MG/ML
4 INJECTION, SOLUTION INFILTRATION; PERINEURAL
Status: COMPLETED | OUTPATIENT
Start: 2025-08-07 | End: 2025-08-07

## 2025-08-07 RX ADMIN — LIDOCAINE HYDROCHLORIDE 4 ML: 10 INJECTION, SOLUTION INFILTRATION; PERINEURAL at 12:25

## 2025-08-07 RX ADMIN — TRIAMCINOLONE ACETONIDE 40 MG: 400 INJECTION, SUSPENSION INTRA-ARTICULAR; INTRAMUSCULAR at 12:25

## 2025-08-07 ASSESSMENT — PAIN - FUNCTIONAL ASSESSMENT: PAIN_FUNCTIONAL_ASSESSMENT: NO/DENIES PAIN

## 2025-08-07 NOTE — PROGRESS NOTES
New patient - left leg pain - F/U From ED on 07/17/2025 and 07/12/2025  Left knee pain that is now shooting up into his thigh  States that he fell on his knee about 2 months ago - 1 week prior to this happening  CT, Ultrasound and xrays done

## 2025-08-12 DIAGNOSIS — M25.562 ACUTE PAIN OF LEFT KNEE: ICD-10-CM

## 2025-08-12 RX ORDER — METHOCARBAMOL 500 MG/1
500 TABLET, FILM COATED ORAL 2 TIMES DAILY
Qty: 20 TABLET | Refills: 0 | Status: CANCELLED | OUTPATIENT
Start: 2025-08-12 | End: 2025-08-22

## 2025-08-13 DIAGNOSIS — M25.562 ACUTE PAIN OF LEFT KNEE: ICD-10-CM

## 2025-08-13 RX ORDER — METHOCARBAMOL 500 MG/1
500 TABLET, FILM COATED ORAL 2 TIMES DAILY
Qty: 20 TABLET | Refills: 0 | Status: CANCELLED | OUTPATIENT
Start: 2025-08-12 | End: 2025-08-22

## 2025-08-14 ENCOUNTER — TELEPHONE (OUTPATIENT)
Dept: PRIMARY CARE | Facility: CLINIC | Age: 60
End: 2025-08-14
Payer: COMMERCIAL

## 2025-08-14 ENCOUNTER — TELEPHONE (OUTPATIENT)
Dept: ORTHOPEDIC SURGERY | Facility: HOSPITAL | Age: 60
End: 2025-08-14
Payer: COMMERCIAL

## 2025-08-18 ENCOUNTER — APPOINTMENT (OUTPATIENT)
Dept: OPHTHALMOLOGY | Facility: CLINIC | Age: 60
End: 2025-08-18
Payer: COMMERCIAL

## 2025-08-18 ENCOUNTER — HOSPITAL ENCOUNTER (OUTPATIENT)
Dept: RADIOLOGY | Facility: CLINIC | Age: 60
Discharge: HOME | End: 2025-08-18
Payer: COMMERCIAL

## 2025-08-18 ENCOUNTER — APPOINTMENT (OUTPATIENT)
Facility: CLINIC | Age: 60
End: 2025-08-18
Payer: COMMERCIAL

## 2025-08-18 ENCOUNTER — OFFICE VISIT (OUTPATIENT)
Facility: CLINIC | Age: 60
End: 2025-08-18
Payer: COMMERCIAL

## 2025-08-18 VITALS — WEIGHT: 269 LBS | HEIGHT: 71 IN | BODY MASS INDEX: 37.66 KG/M2

## 2025-08-18 DIAGNOSIS — M23.301 DEGENERATIVE TEAR OF LATERAL MENISCUS OF LEFT KNEE: ICD-10-CM

## 2025-08-18 DIAGNOSIS — S76.312A TEAR OF LEFT HAMSTRING, INITIAL ENCOUNTER: ICD-10-CM

## 2025-08-18 DIAGNOSIS — M54.50 LOW BACK PAIN, UNSPECIFIED BACK PAIN LATERALITY, UNSPECIFIED CHRONICITY, UNSPECIFIED WHETHER SCIATICA PRESENT: ICD-10-CM

## 2025-08-18 PROCEDURE — 99213 OFFICE O/P EST LOW 20 MIN: CPT | Performed by: ORTHOPAEDIC SURGERY

## 2025-08-18 PROCEDURE — 3008F BODY MASS INDEX DOCD: CPT | Performed by: ORTHOPAEDIC SURGERY

## 2025-08-18 PROCEDURE — 73560 X-RAY EXAM OF KNEE 1 OR 2: CPT | Mod: LT

## 2025-08-18 ASSESSMENT — PAIN SCALES - GENERAL: PAINLEVEL_OUTOF10: 3

## 2025-08-18 ASSESSMENT — PAIN - FUNCTIONAL ASSESSMENT: PAIN_FUNCTIONAL_ASSESSMENT: 0-10

## 2025-08-20 ENCOUNTER — HOSPITAL ENCOUNTER (OUTPATIENT)
Dept: RADIOLOGY | Facility: HOSPITAL | Age: 60
End: 2025-08-20
Payer: COMMERCIAL

## 2025-08-21 ENCOUNTER — APPOINTMENT (OUTPATIENT)
Dept: ORTHOPEDIC SURGERY | Facility: HOSPITAL | Age: 60
End: 2025-08-21
Payer: COMMERCIAL

## 2025-09-10 ENCOUNTER — APPOINTMENT (OUTPATIENT)
Dept: ENDOCRINOLOGY | Facility: CLINIC | Age: 60
End: 2025-09-10
Payer: COMMERCIAL

## 2025-09-12 ENCOUNTER — APPOINTMENT (OUTPATIENT)
Dept: ENDOCRINOLOGY | Facility: CLINIC | Age: 60
End: 2025-09-12
Payer: COMMERCIAL

## 2025-09-15 ENCOUNTER — APPOINTMENT (OUTPATIENT)
Dept: OPHTHALMOLOGY | Facility: CLINIC | Age: 60
End: 2025-09-15
Payer: COMMERCIAL

## (undated) DEVICE — ADHESIVE, SKIN, MASTISOL, 2/3 CC VIAL

## (undated) DEVICE — BLADE, SAGITTAL DUAL CUT, 25 X 90 X 1.27

## (undated) DEVICE — WOUND SYSTEM, DEBRIDEMENT & CLEANING, O.R DUOPAK

## (undated) DEVICE — SUTURE, VICRYL, 0, 36 IN, CT-1, UNDYED

## (undated) DEVICE — GLOVE, SURGICAL, PROTEXIS PI BLUE W/NEUTHERA, 8.0, PF, LF

## (undated) DEVICE — DRAPE, SHEET, U, STERI DRAPE, 47 X 51 IN, DISPOSABLE, STERILE

## (undated) DEVICE — DRAPE, SHEET, 17 X 23 IN

## (undated) DEVICE — ELECTRODE, ELECTROSURGICAL, BLADE, EXTENDED

## (undated) DEVICE — APPLICATOR, CHLORAPREP, W/ORANGE TINT, 26ML

## (undated) DEVICE — DRAPE, SHEET, U, W/ADHESIVE STRIP, IMPERVIOUS, 60 X 70 IN, DISPOSABLE, LF, STERILE

## (undated) DEVICE — SOLUTION, IRRIGATION, USP, SODIUM CHLORIDE 0.9%, 3000 ML

## (undated) DEVICE — GLOVE, PROTEXIS PI CLASSIC, SZ-7.5, PF, LF

## (undated) DEVICE — SET-UP PACK, BASIC, MAYO STAND COVER, SUTURE BAG, TABLE COVER, LF

## (undated) DEVICE — 3M IOBAN 2 ANTIMICROBIAL INCISE DRAPE, 60CM X 60CM

## (undated) DEVICE — SUTURE, VICRYL, 2-0, 36 IN, CT-1, UNDYED

## (undated) DEVICE — SCREW DRILL, PERIPHERAL, 2.7MM

## (undated) DEVICE — STRIP, SKIN CLOSURE, STERI STRIP, REINFORCED, 0.5 X 4 IN

## (undated) DEVICE — STOCKINETTE, IMPERVIOUS, 9 X 48 IN, STERILE

## (undated) DEVICE — GOWN, ASTOUND, XL

## (undated) DEVICE — CLEANER, ELECTROSURGICAL, TIP, 5 X 5 CM, LF

## (undated) DEVICE — DRESSING, GAUZE, DRAIN SPONGE, 6 PLY, EXCILON, 4 X 4 IN, STERILE

## (undated) DEVICE — DRAPE, SHEET, THREE QUARTER, FAN FOLD, 57 X 77 IN

## (undated) DEVICE — COVER HANDLE LIGHT, STERIS, BLUE, STERILE

## (undated) DEVICE — DRAPE, INSTRUMENT, W/POUCH, STERI DRAPE, 7 X 11 IN, DISPOSABLE, STERILE

## (undated) DEVICE — MASK, FACE, TENET, FOAM POSITIONING, DISPOSABLE

## (undated) DEVICE — DRESSING, GAUZE, SPONGE, VERSALON, ALL PURPOSE, 4 X 4 IN, SOFT

## (undated) DEVICE — TIP, SUCTION, YANKAUER, BULB, OPEN TIP, W/O CONTROL VENT, LF, CLEAR

## (undated) DEVICE — PROTECTOR, NERVE, ULNAR, PINK